# Patient Record
Sex: MALE | Race: WHITE | NOT HISPANIC OR LATINO | Employment: OTHER | URBAN - METROPOLITAN AREA
[De-identification: names, ages, dates, MRNs, and addresses within clinical notes are randomized per-mention and may not be internally consistent; named-entity substitution may affect disease eponyms.]

---

## 2020-05-07 LAB
CHOLEST SERPL-MCNC: 103 MG/DL
HBA1C MFR BLD: 6.2 % (ref 0–5.6)
HDLC SERPL-MCNC: 26 MG/DL
LDLC SERPL CALC-MCNC: 49 MG/DL
MICROAL CRT RATIO 4634: 767
TRIGL SERPL-MCNC: 221 MG/DL

## 2020-09-22 PROBLEM — E29.1 HYPOGONADISM IN MALE: Status: ACTIVE | Noted: 2020-09-22

## 2020-09-22 PROBLEM — I10 ESSENTIAL HYPERTENSION: Status: ACTIVE | Noted: 2020-09-22

## 2020-09-22 PROBLEM — E78.5 DYSLIPIDEMIA: Status: ACTIVE | Noted: 2020-09-22

## 2020-09-22 PROBLEM — E11.65 UNCONTROLLED TYPE 2 DIABETES MELLITUS WITH HYPERGLYCEMIA (HCC): Status: ACTIVE | Noted: 2020-09-22

## 2020-09-22 PROBLEM — E03.9 ACQUIRED HYPOTHYROIDISM: Status: ACTIVE | Noted: 2020-09-22

## 2020-09-22 RX ORDER — METFORMIN HYDROCHLORIDE 500 MG/1
1000 TABLET, EXTENDED RELEASE ORAL
COMMUNITY
End: 2021-07-07

## 2020-09-22 RX ORDER — PREGABALIN 150 MG/1
150 CAPSULE ORAL DAILY
COMMUNITY
End: 2020-09-23 | Stop reason: SDUPTHER

## 2020-09-22 RX ORDER — PIOGLITAZONEHYDROCHLORIDE 30 MG/1
30 TABLET ORAL DAILY
COMMUNITY
End: 2021-03-17

## 2020-09-22 RX ORDER — ATENOLOL 50 MG/1
50 TABLET ORAL DAILY
COMMUNITY
End: 2021-01-04 | Stop reason: SDUPTHER

## 2020-09-22 RX ORDER — TESTOSTERONE CYPIONATE 200 MG/ML
200 INJECTION, SOLUTION INTRAMUSCULAR
COMMUNITY
End: 2020-12-04 | Stop reason: SDUPTHER

## 2020-09-22 RX ORDER — GLIMEPIRIDE 1 MG/1
1 TABLET ORAL EVERY MORNING
COMMUNITY
End: 2020-09-23

## 2020-09-22 RX ORDER — SILDENAFIL 100 MG/1
100 TABLET, FILM COATED ORAL PRN
COMMUNITY

## 2020-09-22 RX ORDER — ATORVASTATIN CALCIUM 40 MG/1
40 TABLET, FILM COATED ORAL NIGHTLY
COMMUNITY
End: 2020-10-01

## 2020-09-22 RX ORDER — LOSARTAN POTASSIUM 50 MG/1
50 TABLET ORAL DAILY
COMMUNITY
End: 2020-12-30

## 2020-09-22 RX ORDER — LEVOTHYROXINE SODIUM 0.07 MG/1
75 TABLET ORAL
COMMUNITY
End: 2020-09-23

## 2020-09-22 NOTE — PROGRESS NOTES
"Endocrinology Clinic Progress Note  PCP: No primary care provider on file.      Reason for consult: Type 2 diabetes, Hypothyroid, Hypogonadism in male.       HPI:  Raheem Ospina is a 55 y.o. old patient who comes in today for evaluation of above stated problems.   1. Type 2 diabetes, uncontrolled.    Most Recent HbA1c:   Lab Results   Component Value Date/Time    HBA1C 6.5 (A) 09/23/2020 02:24 PM      Previous A1c was 6.2 on 5/7/2020  Current Diabetes Regimen:  Ozempic 1 mg per week  Cyclocet 0.8 mg 5 tabs in the morning  Pioglitazone 30 mg daily  Metformin 1000 mg bid    Testing blood sugars, states he has not tested his blood sugar in about 6 weeks.  States when he used to test his blood sugars usually ran in the  range.   Hypoglycemia:  None    Exercise: no regular exercise, sedentary  Diet: \"healthy\" diet  in general  Last Retinal Exam: states current, has a cataract in his right eye.    Daily Foot Exam: yes has neuropathy in both feet.     Foot Exam:  Monofilament: done  Monofilament testing with a 10 gram force: sensation intact: decreased bilaterally  Visual Inspection: Feet with maceration, ulcers, fissures.  He has a ulceration on the bottom of his great toe on the right foot.   Pedal pulses: intact bilaterally    2. Hypogonadism in male: on Testosterone Cypionate 200 mg IM every 21 days.  Last dose was in July.   Testosterone level on 05/07/2020 was 731  Testosterone injection of 200 mg given in clinic today.   3. Hypothyroid: currently on Synthroid 75 mcg every morning on empty stomach.   TSH level on 05/07/2020 was 0.88 and Free T4 level was 1.4  4. Dyslipidemia: currently on Atorvastatin 40 mg per day.      Ref. Range 5/7/2020 00:00   Cholesterol,Tot Unknown 103   Triglycerides Unknown 221   HDL Unknown 26   LDL Unknown 49          ROS:  Constitutional: No weight loss  Cardiac: No palpitations or racing heart  Resp: No shortness of breath  Neuro: No numbness or tinging in feet  Endo: No " "heat or cold intolerance, no polyuria or polydipsia  All other systems were reviewed and were negative.    Past Medical History:  Patient Active Problem List    Diagnosis Date Noted   • Uncontrolled type 2 diabetes mellitus with hyperglycemia (HCC) 09/22/2020   • Dyslipidemia 09/22/2020   • Acquired hypothyroidism 09/22/2020   • Hypogonadism in male 09/22/2020   • Essential hypertension 09/22/2020           Labs: Reviewed    Physical Examination:  Vital signs: BP (!) 88/46 (BP Location: Left arm, Patient Position: Sitting, BP Cuff Size: Adult)   Pulse 95   Ht 1.905 m (6' 3\")   Wt 103 kg (227 lb)   SpO2 96%   BMI 28.37 kg/m²  Body mass index is 28.37 kg/m².  General: No apparent distress, cooperative  Eyes: No scleral icterus or discharge  ENMT: Normal on external inspection of nose, lips, normal thyroid exam  Neck: No abnormal masses on inspection  Resp: Normal effort, clear to auscultation bilaterally   CVS: Regular rate and rhythm, S1 S2 normal, no murmur   Extremities: No edema  Abdomen: abdominal obesity present  Neuro: Alert and oriented  Skin: No rash, visible tatto marks on forearm  Psych: Normal mood and affect, intact memory and able to make informed decisions      Assessment and Plan:  1. Uncontrolled type 2 diabetes mellitus with hyperglycemia (HCC)  Stop cycloset; add farxiga 10 mg daily.   - Discussed diabetic diet, encouraged portion control.   - Discussed importance of testing blood sugars and keeping logs.   - Discussed importance of daily exercise, recommended 30 minutes per day  - Reviewed medications and advised how to take.  - Discussed importance of immunizations and yearly eye exams.   - Advised daily foot  exams. Educated on signs of infection.   - Educated on need to stay well hydrated with water.  - Educated to call with any questions or problems.      2. Dyslipidemia  Continue statin.     3. Acquired hypothyroidism  Continue levothyroxine.     4. Hypogonadism in male  Continue " testosterone and advised to do 200 mg IM every 2 weeks.     5. Essential hypertension  Controlled.     Total face to face time spent with patient equals 60 minutes; 35/60 minutes were spent on counseling the patient about side effect and benefits of ozempic, farxiga, and pathophysiology of testosterone and side effects and benefits.       Return in about 3 months (around 12/23/2020).    Thank you for allowing me to participate in the care of this patient.    Adair Mas M.D.  09/22/20    CC:   No primary care provider on file.    This note was created using voice recognition software (Dragon). The accuracy of the dictation is limited by the abilities of the software. I have reviewed the note prior to signing, however some errors in grammar and context are still possible. If you have any questions related to this note please do not hesitate to contact our office.

## 2020-09-23 ENCOUNTER — OFFICE VISIT (OUTPATIENT)
Dept: ENDOCRINOLOGY | Facility: MEDICAL CENTER | Age: 65
End: 2020-09-23
Attending: INTERNAL MEDICINE
Payer: COMMERCIAL

## 2020-09-23 VITALS
BODY MASS INDEX: 28.23 KG/M2 | OXYGEN SATURATION: 96 % | DIASTOLIC BLOOD PRESSURE: 46 MMHG | SYSTOLIC BLOOD PRESSURE: 88 MMHG | WEIGHT: 227 LBS | HEIGHT: 75 IN | HEART RATE: 95 BPM

## 2020-09-23 DIAGNOSIS — E03.9 ACQUIRED HYPOTHYROIDISM: ICD-10-CM

## 2020-09-23 DIAGNOSIS — G62.9 NEUROPATHY: ICD-10-CM

## 2020-09-23 DIAGNOSIS — E78.5 DYSLIPIDEMIA: ICD-10-CM

## 2020-09-23 DIAGNOSIS — E29.1 HYPOGONADISM IN MALE: ICD-10-CM

## 2020-09-23 DIAGNOSIS — E53.8 VITAMIN B12 DEFICIENCY: ICD-10-CM

## 2020-09-23 DIAGNOSIS — E11.65 UNCONTROLLED TYPE 2 DIABETES MELLITUS WITH HYPERGLYCEMIA (HCC): ICD-10-CM

## 2020-09-23 DIAGNOSIS — E55.9 VITAMIN D DEFICIENCY: ICD-10-CM

## 2020-09-23 DIAGNOSIS — I10 ESSENTIAL HYPERTENSION: ICD-10-CM

## 2020-09-23 LAB
HBA1C MFR BLD: 6.5 % (ref 0–5.6)
INT CON NEG: ABNORMAL
INT CON POS: ABNORMAL

## 2020-09-23 PROCEDURE — 99204 OFFICE O/P NEW MOD 45 MIN: CPT | Performed by: INTERNAL MEDICINE

## 2020-09-23 PROCEDURE — 83036 HEMOGLOBIN GLYCOSYLATED A1C: CPT | Performed by: INTERNAL MEDICINE

## 2020-09-23 PROCEDURE — 99212 OFFICE O/P EST SF 10 MIN: CPT | Performed by: INTERNAL MEDICINE

## 2020-09-23 PROCEDURE — 99205 OFFICE O/P NEW HI 60 MIN: CPT | Performed by: INTERNAL MEDICINE

## 2020-09-23 PROCEDURE — 96372 THER/PROPH/DIAG INJ SC/IM: CPT | Performed by: INTERNAL MEDICINE

## 2020-09-23 RX ORDER — DAPAGLIFLOZIN 10 MG/1
1 TABLET, FILM COATED ORAL DAILY
Qty: 90 TAB | Refills: 3 | Status: SHIPPED | OUTPATIENT
Start: 2020-09-23

## 2020-09-23 RX ORDER — FOLIC ACID/MULTIVIT,IRON,MINER 0.4MG-18MG
TABLET ORAL
COMMUNITY

## 2020-09-23 RX ORDER — MULTIVIT WITH MINERALS/LUTEIN
TABLET ORAL
COMMUNITY
End: 2023-12-31

## 2020-09-23 RX ORDER — ESCITALOPRAM OXALATE 10 MG/1
10 TABLET ORAL DAILY
COMMUNITY
End: 2020-12-28 | Stop reason: SDUPTHER

## 2020-09-23 RX ORDER — PREGABALIN 150 MG/1
150 CAPSULE ORAL 3 TIMES DAILY
Qty: 90 CAP | Refills: 3 | Status: SHIPPED | OUTPATIENT
Start: 2020-09-23 | End: 2021-01-21

## 2020-09-23 RX ORDER — MULTIVIT-MIN/IRON/FOLIC ACID/K 18-600-40
CAPSULE ORAL
COMMUNITY

## 2020-09-23 RX ORDER — SEMAGLUTIDE 1.34 MG/ML
INJECTION, SOLUTION SUBCUTANEOUS
COMMUNITY
End: 2020-10-05

## 2020-09-23 RX ORDER — LEVOTHYROXINE SODIUM 0.07 MG/1
75 TABLET ORAL
COMMUNITY
End: 2020-12-30 | Stop reason: SDUPTHER

## 2020-09-23 RX ORDER — TESTOSTERONE CYPIONATE 200 MG/ML
200 INJECTION, SOLUTION INTRAMUSCULAR
Status: DISCONTINUED | OUTPATIENT
Start: 2020-09-23 | End: 2020-12-28

## 2020-09-23 RX ADMIN — TESTOSTERONE CYPIONATE 200 MG: 200 INJECTION, SOLUTION INTRAMUSCULAR at 14:21

## 2020-10-01 RX ORDER — ATORVASTATIN CALCIUM 40 MG/1
TABLET, FILM COATED ORAL
Qty: 90 TAB | Refills: 2 | Status: SHIPPED | OUTPATIENT
Start: 2020-10-01 | End: 2020-12-30

## 2020-10-05 DIAGNOSIS — E11.65 UNCONTROLLED TYPE 2 DIABETES MELLITUS WITH HYPERGLYCEMIA (HCC): ICD-10-CM

## 2020-10-05 RX ORDER — SEMAGLUTIDE 1.34 MG/ML
INJECTION, SOLUTION SUBCUTANEOUS
Qty: 3 ML | Refills: 1 | Status: SHIPPED | OUTPATIENT
Start: 2020-10-05 | End: 2021-01-04 | Stop reason: SDUPTHER

## 2020-10-09 ENCOUNTER — NON-PROVIDER VISIT (OUTPATIENT)
Dept: ENDOCRINOLOGY | Facility: MEDICAL CENTER | Age: 65
End: 2020-10-09
Attending: INTERNAL MEDICINE
Payer: COMMERCIAL

## 2020-10-09 PROCEDURE — 96372 THER/PROPH/DIAG INJ SC/IM: CPT | Performed by: INTERNAL MEDICINE

## 2020-10-09 RX ADMIN — TESTOSTERONE CYPIONATE 200 MG: 200 INJECTION, SOLUTION INTRAMUSCULAR at 11:23

## 2020-10-09 NOTE — NON-PROVIDER
Raheem Ospina is a 65 y.o. male here for a non-provider visit for testosterone injection.    Reason for injection: low testosterone  Order in MAR?: Yes  Patient supplied?:No  Minimum interval has been met for this injection (per MAR order): Yes    Order and dose verified by: AN  Patient tolerated injection and no adverse effects were observed or reported: Yes    # of Administrations remaining in MAR: 2

## 2020-10-16 ENCOUNTER — TELEPHONE (OUTPATIENT)
Dept: MEDICAL GROUP | Facility: PHYSICIAN GROUP | Age: 65
End: 2020-10-16

## 2020-10-16 NOTE — TELEPHONE ENCOUNTER
1. Caller Name: Anjali Ospina      Call Back Number: [phone]        How would the patient prefer to be contacted with a response:     Patients wife Anjali called because she just scheduled a new patient appointment with you and wanted to know if you would be willing to take her . Anjali was told that you dont take patients over the age of 65. Please advise.

## 2020-10-16 NOTE — TELEPHONE ENCOUNTER
I would love to but at this time I am not taking any new patients, I anticipate opening up in January again.

## 2020-10-23 ENCOUNTER — NON-PROVIDER VISIT (OUTPATIENT)
Dept: ENDOCRINOLOGY | Facility: MEDICAL CENTER | Age: 65
End: 2020-10-23
Attending: INTERNAL MEDICINE
Payer: COMMERCIAL

## 2020-10-23 PROCEDURE — 96372 THER/PROPH/DIAG INJ SC/IM: CPT | Performed by: INTERNAL MEDICINE

## 2020-10-23 RX ADMIN — TESTOSTERONE CYPIONATE 200 MG: 200 INJECTION, SOLUTION INTRAMUSCULAR at 11:00

## 2020-11-06 ENCOUNTER — NON-PROVIDER VISIT (OUTPATIENT)
Dept: ENDOCRINOLOGY | Facility: MEDICAL CENTER | Age: 65
End: 2020-11-06
Attending: INTERNAL MEDICINE
Payer: COMMERCIAL

## 2020-11-06 PROCEDURE — 96372 THER/PROPH/DIAG INJ SC/IM: CPT | Performed by: INTERNAL MEDICINE

## 2020-11-06 PROCEDURE — 700111 HCHG RX REV CODE 636 W/ 250 OVERRIDE (IP): Performed by: INTERNAL MEDICINE

## 2020-11-06 RX ADMIN — TESTOSTERONE CYPIONATE 200 MG: 200 INJECTION, SOLUTION INTRAMUSCULAR at 16:16

## 2020-11-12 ENCOUNTER — TELEPHONE (OUTPATIENT)
Dept: SCHEDULING | Facility: IMAGING CENTER | Age: 65
End: 2020-11-12

## 2020-11-16 ENCOUNTER — HOSPITAL ENCOUNTER (OUTPATIENT)
Facility: MEDICAL CENTER | Age: 65
End: 2020-11-16
Attending: PODIATRIST
Payer: COMMERCIAL

## 2020-11-16 PROCEDURE — 87070 CULTURE OTHR SPECIMN AEROBIC: CPT

## 2020-11-17 ENCOUNTER — HOSPITAL ENCOUNTER (OUTPATIENT)
Dept: RADIOLOGY | Facility: MEDICAL CENTER | Age: 65
End: 2020-11-17
Attending: PODIATRIST
Payer: COMMERCIAL

## 2020-11-17 DIAGNOSIS — L97.519 DIABETIC ULCER OF RIGHT GREAT TOE (HCC): ICD-10-CM

## 2020-11-17 DIAGNOSIS — E11.621 DIABETIC ULCER OF RIGHT GREAT TOE (HCC): ICD-10-CM

## 2020-11-17 PROCEDURE — 73630 X-RAY EXAM OF FOOT: CPT | Mod: RT

## 2020-11-19 LAB
BACTERIA WND AEROBE CULT: ABNORMAL
BACTERIA WND AEROBE CULT: ABNORMAL
SIGNIFICANT IND 70042: ABNORMAL
SITE SITE: ABNORMAL
SOURCE SOURCE: ABNORMAL

## 2020-12-04 ENCOUNTER — NON-PROVIDER VISIT (OUTPATIENT)
Dept: ENDOCRINOLOGY | Facility: MEDICAL CENTER | Age: 65
End: 2020-12-04
Attending: INTERNAL MEDICINE
Payer: COMMERCIAL

## 2020-12-04 DIAGNOSIS — E29.1 HYPOGONADISM IN MALE: ICD-10-CM

## 2020-12-04 PROCEDURE — 700111 HCHG RX REV CODE 636 W/ 250 OVERRIDE (IP): Performed by: INTERNAL MEDICINE

## 2020-12-04 PROCEDURE — 96372 THER/PROPH/DIAG INJ SC/IM: CPT | Performed by: INTERNAL MEDICINE

## 2020-12-04 RX ORDER — TESTOSTERONE CYPIONATE 200 MG/ML
200 INJECTION, SOLUTION INTRAMUSCULAR
Qty: 2 ML | Refills: 5 | Status: SHIPPED | OUTPATIENT
Start: 2020-12-04 | End: 2020-12-28

## 2020-12-04 RX ADMIN — TESTOSTERONE CYPIONATE 200 MG: 200 INJECTION, SOLUTION INTRAMUSCULAR at 10:09

## 2020-12-11 ENCOUNTER — HOSPITAL ENCOUNTER (OUTPATIENT)
Dept: LAB | Facility: MEDICAL CENTER | Age: 65
End: 2020-12-11
Attending: INTERNAL MEDICINE
Payer: COMMERCIAL

## 2020-12-11 DIAGNOSIS — I10 ESSENTIAL HYPERTENSION: ICD-10-CM

## 2020-12-11 DIAGNOSIS — E53.8 VITAMIN B12 DEFICIENCY: ICD-10-CM

## 2020-12-11 DIAGNOSIS — E11.65 UNCONTROLLED TYPE 2 DIABETES MELLITUS WITH HYPERGLYCEMIA (HCC): ICD-10-CM

## 2020-12-11 DIAGNOSIS — G62.9 NEUROPATHY: ICD-10-CM

## 2020-12-11 DIAGNOSIS — E29.1 HYPOGONADISM IN MALE: ICD-10-CM

## 2020-12-11 DIAGNOSIS — E78.5 DYSLIPIDEMIA: ICD-10-CM

## 2020-12-11 DIAGNOSIS — E03.9 ACQUIRED HYPOTHYROIDISM: ICD-10-CM

## 2020-12-11 DIAGNOSIS — E55.9 VITAMIN D DEFICIENCY: ICD-10-CM

## 2020-12-11 LAB
25(OH)D3 SERPL-MCNC: 34 NG/ML (ref 30–100)
ALBUMIN SERPL BCP-MCNC: 4.3 G/DL (ref 3.2–4.9)
ALBUMIN/GLOB SERPL: 1.2 G/DL
ALP SERPL-CCNC: 121 U/L (ref 30–99)
ALT SERPL-CCNC: 11 U/L (ref 2–50)
ANION GAP SERPL CALC-SCNC: 9 MMOL/L (ref 7–16)
AST SERPL-CCNC: 15 U/L (ref 12–45)
BILIRUB SERPL-MCNC: 0.8 MG/DL (ref 0.1–1.5)
BUN SERPL-MCNC: 17 MG/DL (ref 8–22)
CALCIUM SERPL-MCNC: 9.9 MG/DL (ref 8.5–10.5)
CHLORIDE SERPL-SCNC: 98 MMOL/L (ref 96–112)
CHOLEST SERPL-MCNC: 111 MG/DL (ref 100–199)
CO2 SERPL-SCNC: 28 MMOL/L (ref 20–33)
CREAT SERPL-MCNC: 1.08 MG/DL (ref 0.5–1.4)
CREAT UR-MCNC: 104.99 MG/DL
ERYTHROCYTE [DISTWIDTH] IN BLOOD BY AUTOMATED COUNT: 55.3 FL (ref 35.9–50)
FASTING STATUS PATIENT QL REPORTED: NORMAL
GLOBULIN SER CALC-MCNC: 3.7 G/DL (ref 1.9–3.5)
GLUCOSE SERPL-MCNC: 133 MG/DL (ref 65–99)
HCT VFR BLD AUTO: 59.2 % (ref 42–52)
HDLC SERPL-MCNC: 28 MG/DL
HGB BLD-MCNC: 18.2 G/DL (ref 14–18)
LDLC SERPL CALC-MCNC: 52 MG/DL
MCH RBC QN AUTO: 29 PG (ref 27–33)
MCHC RBC AUTO-ENTMCNC: 30.7 G/DL (ref 33.7–35.3)
MCV RBC AUTO: 94.4 FL (ref 81.4–97.8)
MICROALBUMIN UR-MCNC: 55 MG/DL
MICROALBUMIN/CREAT UR: 524 MG/G (ref 0–30)
PLATELET # BLD AUTO: 227 K/UL (ref 164–446)
PMV BLD AUTO: 11.1 FL (ref 9–12.9)
POTASSIUM SERPL-SCNC: 5 MMOL/L (ref 3.6–5.5)
PROT SERPL-MCNC: 8 G/DL (ref 6–8.2)
RBC # BLD AUTO: 6.27 M/UL (ref 4.7–6.1)
SODIUM SERPL-SCNC: 135 MMOL/L (ref 135–145)
T3 SERPL-MCNC: 107 NG/DL (ref 60–181)
T3FREE SERPL-MCNC: 3.12 PG/ML (ref 2–4.4)
T4 FREE SERPL-MCNC: 1.49 NG/DL (ref 0.93–1.7)
TESTOST SERPL-MCNC: 853 NG/DL (ref 175–781)
THYROPEROXIDASE AB SERPL-ACNC: <9 IU/ML (ref 0–9)
TRIGL SERPL-MCNC: 154 MG/DL (ref 0–149)
TSH SERPL DL<=0.005 MIU/L-ACNC: 1.39 UIU/ML (ref 0.38–5.33)
VIT B12 SERPL-MCNC: 346 PG/ML (ref 211–911)
WBC # BLD AUTO: 11.6 K/UL (ref 4.8–10.8)

## 2020-12-11 PROCEDURE — 84439 ASSAY OF FREE THYROXINE: CPT

## 2020-12-11 PROCEDURE — 84480 ASSAY TRIIODOTHYRONINE (T3): CPT

## 2020-12-11 PROCEDURE — 84270 ASSAY OF SEX HORMONE GLOBUL: CPT

## 2020-12-11 PROCEDURE — 82607 VITAMIN B-12: CPT

## 2020-12-11 PROCEDURE — 36415 COLL VENOUS BLD VENIPUNCTURE: CPT

## 2020-12-11 PROCEDURE — 82570 ASSAY OF URINE CREATININE: CPT

## 2020-12-11 PROCEDURE — 82306 VITAMIN D 25 HYDROXY: CPT

## 2020-12-11 PROCEDURE — 80061 LIPID PANEL: CPT

## 2020-12-11 PROCEDURE — 80053 COMPREHEN METABOLIC PANEL: CPT

## 2020-12-11 PROCEDURE — 84403 ASSAY OF TOTAL TESTOSTERONE: CPT

## 2020-12-11 PROCEDURE — 82043 UR ALBUMIN QUANTITATIVE: CPT

## 2020-12-11 PROCEDURE — 84481 FREE ASSAY (FT-3): CPT

## 2020-12-11 PROCEDURE — 85027 COMPLETE CBC AUTOMATED: CPT

## 2020-12-11 PROCEDURE — 84443 ASSAY THYROID STIM HORMONE: CPT

## 2020-12-11 PROCEDURE — 86376 MICROSOMAL ANTIBODY EACH: CPT

## 2020-12-13 LAB — SHBG SERPL-SCNC: 34 NMOL/L (ref 11–80)

## 2020-12-16 PROCEDURE — RXMED WILLOW AMBULATORY MEDICATION CHARGE: Performed by: INTERNAL MEDICINE

## 2020-12-18 ENCOUNTER — APPOINTMENT (OUTPATIENT)
Dept: ENDOCRINOLOGY | Facility: MEDICAL CENTER | Age: 65
End: 2020-12-18
Attending: INTERNAL MEDICINE
Payer: COMMERCIAL

## 2020-12-18 ENCOUNTER — PHARMACY VISIT (OUTPATIENT)
Dept: PHARMACY | Facility: MEDICAL CENTER | Age: 65
End: 2020-12-18
Payer: COMMERCIAL

## 2020-12-28 ENCOUNTER — NON-PROVIDER VISIT (OUTPATIENT)
Dept: ENDOCRINOLOGY | Facility: MEDICAL CENTER | Age: 65
End: 2020-12-28
Attending: INTERNAL MEDICINE
Payer: COMMERCIAL

## 2020-12-28 VITALS — WEIGHT: 224 LBS | BODY MASS INDEX: 27.85 KG/M2 | HEIGHT: 75 IN

## 2020-12-28 DIAGNOSIS — E11.65 UNCONTROLLED TYPE 2 DIABETES MELLITUS WITH HYPERGLYCEMIA (HCC): ICD-10-CM

## 2020-12-28 DIAGNOSIS — F32.A DEPRESSION, UNSPECIFIED DEPRESSION TYPE: ICD-10-CM

## 2020-12-28 DIAGNOSIS — E29.1 HYPOGONADISM IN MALE: ICD-10-CM

## 2020-12-28 DIAGNOSIS — E78.5 DYSLIPIDEMIA: ICD-10-CM

## 2020-12-28 DIAGNOSIS — E03.9 ACQUIRED HYPOTHYROIDISM: ICD-10-CM

## 2020-12-28 PROCEDURE — 99214 OFFICE O/P EST MOD 30 MIN: CPT | Mod: 95,CR | Performed by: INTERNAL MEDICINE

## 2020-12-28 RX ORDER — ESCITALOPRAM OXALATE 10 MG/1
10 TABLET ORAL DAILY
Qty: 30 TAB | Refills: 3 | Status: SHIPPED | OUTPATIENT
Start: 2020-12-28 | End: 2021-03-24

## 2020-12-28 RX ORDER — TESTOSTERONE CYPIONATE 200 MG/ML
150 INJECTION, SOLUTION INTRAMUSCULAR
Qty: 2 ML | Refills: 5 | Status: SHIPPED | OUTPATIENT
Start: 2020-12-28 | End: 2021-05-14

## 2020-12-28 ASSESSMENT — FIBROSIS 4 INDEX: FIB4 SCORE: 1.3

## 2020-12-28 NOTE — PROGRESS NOTES
RN-CDE Note  Patient was presented for a telehealth consultation via secure and encrypted videoconferencing technology. This encounter was conducted via Zoom . Verbal consent was obtained. Patient's identity was verified.    Subjective:   Endocrinology Clinic Progress Note  PCP: Pcp Pt States None    HPI:  Raheem Ospina is a 65 y.o. old patient who is seen today for review of type 2 Diabetes, Hypothyroidism, and Hypogonadism.    Patient has a history of fairly controlled type 2 diabetes and is on Metformin 1000 mg twice a day, Actos 30 mg daily, Ozempic 1 mg once a week and Farxiga 10 mg daily.  His last A1c was 6.5% in September 23, 2020 we do not have an updated A1c      He also has primary hypothyroidism and is taking levothyroxine 75 mcg daily which has been his medication for many years and his last TSH was normal 1.39 with a free T4 of 1.49 on December 2020      He has hyperlipidemia and is taking atorvastatin and his last LDL cholesterol was at goal at 52 on December 2020 he does have diabetic kidney disease but he is on an ACE inhibitor.    He has hypogonadism and is on testosterone injections 200 mg every 2 weeks at the [facility] pharmacy his last total testosterone was good at 853 but hematocrit was elevated at 59%.  He denies any symptoms of testosterone excess    He has a history of chronic back pain and was previously on opioids chronically which led to the diagnosis of hypogonadism.      DM:   Last A1c:   Lab Results   Component Value Date/Time    HBA1C 6.5 (A) 09/23/2020 02:24 PM      A1C GOAL: < 7    Diabetes Medications:   Ozempic 1 mg weekly  Actos 30 mg daily  Metformin 1000 mg BID  Farxiga 10 mg daily    Taking above medications as prescribed: yes  Taking daily ASA: Yes    Exercise: no regular exercise, sedentary.  Limited due to right big toe ulceration.  Diet: Eating 1 meal daily and only when hungry.  He has lost 35 pounds since leaving [place].  Patient's body mass index is  unknown because there is no height or weight on file. Exercise and nutrition counseling were performed at this visit.    Glucose monitoring frequency: Testing 1-2 times daily  110-130  Hypoglycemic episodes: no  Last Retinal Exam: In [place] in July and has a cataract  Daily Foot Exam: Yes, seeing Dr. Cordova and his right toe ulceration is almost healed.    Lab Results   Component Value Date/Time    MICROALBCALC 767 05/07/2020    MALBCRT 524 (H) 12/11/2020 06:59 AM    MICROALBUR 55.0 12/11/2020 06:59 AM      ACR Albumin/Creatinine Ratio goal <30   Currently Rx ACE/ARB:yes  Dyslipidemia:  Lab Results   Component Value Date/Time    CHOLSTRLTOT 111 12/11/2020 06:59 AM    LDL 52 12/11/2020 06:59 AM    HDL 28 (A) 12/11/2020 06:59 AM    TRIGLYCERIDE 154 (H) 12/11/2020 06:59 AM       Currently Rx Statin: Yes     He  reports that he has been smoking cigarettes. He has a 35.25 pack-year smoking history. He has never used smokeless tobacco.      ROS:     CONS:     No fever, no chills   EYES:     No diplopia, no blurry vision   CV:           No chest pain, no palpitations   PULM:     No SOB, no cough, no hemoptysis.   GI:            No nausea, no vomiting, no diarrhea, no constipation   ENDO:     No polyuria, no polydipsia, no heat intolerance, no cold intolerance       Past Medical History:  Problem List:  2020-09: Uncontrolled type 2 diabetes mellitus with hyperglycemia   (HCC)  2020-09: Dyslipidemia  2020-09: Acquired hypothyroidism  2020-09: Hypogonadism in male  2020-09: Essential hypertension      Past Surgical History:  Past Surgical History:   Procedure Laterality Date   • MULTIPLE CORONARY ARTERY BYPASS          Allergies:  Benicar [olmesartan]     Social History:  Social History     Tobacco Use   • Smoking status: Current Every Day Smoker     Packs/day: 0.75     Years: 47.00     Pack years: 35.25     Types: Cigarettes   • Smokeless tobacco: Never Used   Substance Use Topics   • Alcohol use: Not on file   • Drug use:  "Not on file        Family History:   family history is not on file.      PHYSICAL EXAM:   OBJECTIVE:  Vital signs: Ht 1.905 m (6' 3\")   Wt 101.6 kg (224 lb)   BMI 28.00 kg/m²   GENERAL: Well-developed, well-nourished in no apparent distress.   EYE:  No ocular asymmetry, PERRLA  HENT: Pink, moist mucous membranes.    NECK: No thyromegaly.   CARDIOVASCULAR:  No murmurs  LUNGS: Clear breath sounds  ABDOMEN: Soft, nontender   EXTREMITIES: No clubbing, cyanosis, or edema.   NEUROLOGICAL: No gross focal motor abnormalities   LYMPH: No cervical adenopathy seen  SKIN: No rashes, lesions.     Labs:  Lab Results   Component Value Date/Time    HBA1C 6.5 (A) 09/23/2020 02:24 PM        Lab Results   Component Value Date/Time    WBC 11.6 (H) 12/11/2020 06:59 AM    RBC 6.27 (H) 12/11/2020 06:59 AM    HEMOGLOBIN 18.2 (H) 12/11/2020 06:59 AM    MCV 94.4 12/11/2020 06:59 AM    MCH 29.0 12/11/2020 06:59 AM    MCHC 30.7 (L) 12/11/2020 06:59 AM    RDW 55.3 (H) 12/11/2020 06:59 AM    MPV 11.1 12/11/2020 06:59 AM       Lab Results   Component Value Date/Time    SODIUM 135 12/11/2020 06:59 AM    POTASSIUM 5.0 12/11/2020 06:59 AM    CHLORIDE 98 12/11/2020 06:59 AM    CO2 28 12/11/2020 06:59 AM    ANION 9.0 12/11/2020 06:59 AM    GLUCOSE 133 (H) 12/11/2020 06:59 AM    BUN 17 12/11/2020 06:59 AM    CREATININE 1.08 12/11/2020 06:59 AM    CALCIUM 9.9 12/11/2020 06:59 AM    ASTSGOT 15 12/11/2020 06:59 AM    ALTSGPT 11 12/11/2020 06:59 AM    TBILIRUBIN 0.8 12/11/2020 06:59 AM    ALBUMIN 4.3 12/11/2020 06:59 AM    TOTPROTEIN 8.0 12/11/2020 06:59 AM    GLOBULIN 3.7 (H) 12/11/2020 06:59 AM    AGRATIO 1.2 12/11/2020 06:59 AM       Lab Results   Component Value Date/Time    CHOLSTRLTOT 111 12/11/2020 0659    TRIGLYCERIDE 154 (H) 12/11/2020 0659    HDL 28 (A) 12/11/2020 0659    LDL 52 12/11/2020 0659       Lab Results   Component Value Date/Time    MICROALBCALC 767 05/07/2020    MALBCRT 524 (H) 12/11/2020 06:59 AM    MICROALBUR 55.0 12/11/2020 06:59 " AM        Lab Results   Component Value Date/Time    TSHULTRASEN 1.390 12/11/2020 0659     No results found for: FREEDIR  Lab Results   Component Value Date/Time    FREET3 3.12 12/11/2020 0659     No results found for: THYSTIMIG        ASSESSMENT/PLAN:     1. Uncontrolled type 2 diabetes mellitus with hyperglycemia (HCC)    Now well controlled continue current diabetes medications  Recommend adequate hydration  Recommend follow-up in 3 6 months a repeat of A1c    Discussed and educated on:   - All medications, side effects and compliance (discussed carefully)  - Annual eye examinations at Ophthalmology  - Home glucose monitoring emphasized  - Weight control and daily exercise    2. Acquired hypothyroidism  Well-controlled  Continue current levothyroxine dose  Recommend repeating TSH in 6 months    3. Dyslipidemia  Controlled continue atorvastatin  Recommend repeating fasting repeat in 12 months    4. Hypogonadism in male  Unstable recommend adjusting testosterone to 150 mg every 2 weeks we will schedule for phlebotomy also recommend evaluation for sleep apnea with primary care physician    5. Depression, unspecified depression type  Stable we are refilling his Lexapro 1 time but in the future he should get future refills from his primary care physician      No follow-ups on file.      This patient during there office visit today was started on a new medication.  Side effects of the new medication were discussed with the patient today in the office.     Thank you kindly for allowing me to participate in the diabetes care plan for this patient.    Bartolome Willoughby MD, [place], [facility]  12/28/20    CC:   Pcp Pt States None

## 2020-12-30 DIAGNOSIS — E03.9 ACQUIRED HYPOTHYROIDISM: ICD-10-CM

## 2020-12-30 RX ORDER — LEVOTHYROXINE SODIUM 0.07 MG/1
TABLET ORAL
Qty: 90 TAB | Refills: 2 | Status: SHIPPED | OUTPATIENT
Start: 2020-12-30 | End: 2021-01-29

## 2020-12-30 RX ORDER — LOSARTAN POTASSIUM 50 MG/1
TABLET ORAL
Qty: 90 TAB | Refills: 2 | Status: SHIPPED | OUTPATIENT
Start: 2020-12-30 | End: 2021-07-07

## 2020-12-30 RX ORDER — ATORVASTATIN CALCIUM 40 MG/1
TABLET, FILM COATED ORAL
Qty: 90 TAB | Refills: 2 | Status: SHIPPED | OUTPATIENT
Start: 2020-12-30

## 2020-12-30 RX ORDER — LEVOTHYROXINE SODIUM 0.07 MG/1
75 TABLET ORAL
Qty: 90 TAB | Refills: 2 | Status: SHIPPED | OUTPATIENT
Start: 2020-12-30

## 2020-12-30 NOTE — TELEPHONE ENCOUNTER
Received request via: Patient    Was the patient seen in the last year in this department? Yes    Does the patient have an active prescription (recently filled or refills available) for medication(s) requested? No     LEVOTHYROXINE SODIUM 75MCG TAB        Will file in chart as: levothyroxine (SYNTHROID) 75 MCG Tab   Sig: TAKE ONE TABLET BY MOUTH DAILY   Disp:  30 Tab    Refills:  Not specified   Start: 12/29/2020   Class: Normal   Non-formulary   Last ordered: 3 months ago by Physician Outpatient Last refill: 11/24/2020   Rx #: [id number]      ATORVASTATIN CALCIUM 40MG TAB        Will file in chart as: atorvastatin (LIPITOR) 40 MG Tab   Sig: TAKE ONE TABLET BY MOUTH DAILY   Disp:  90 Tab    Refills:  1   Start: 12/29/2020   Class: Normal   Non-formulary   Last ordered: 2 months ago by Adair Mas M.D. Last refill: 10/5/2020   Rx #: [id number]      LOSARTAN POTASSIUM 50MG TAB        Will file in chart as: losartan (COZAAR) 50 MG Tab   Sig: TAKE ONE TABLET BY MOUTH DAILY   Disp:  90 Tab    Refills:  Not specified   Start: 12/29/2020   Class: Normal   Non-formulary   Last ordered: 3 months ago by Physician Outpatient Last refill: 9/18/2020   Rx #: [id number]

## 2021-01-04 DIAGNOSIS — I10 ESSENTIAL HYPERTENSION: ICD-10-CM

## 2021-01-04 DIAGNOSIS — E11.65 UNCONTROLLED TYPE 2 DIABETES MELLITUS WITH HYPERGLYCEMIA (HCC): ICD-10-CM

## 2021-01-04 RX ORDER — SEMAGLUTIDE 1.34 MG/ML
1 INJECTION, SOLUTION SUBCUTANEOUS
Qty: 2 EACH | Refills: 3 | Status: SHIPPED | OUTPATIENT
Start: 2021-01-04 | End: 2021-03-30

## 2021-01-04 RX ORDER — ATENOLOL 50 MG/1
50 TABLET ORAL DAILY
Qty: 30 TAB | Refills: 3 | Status: SHIPPED | OUTPATIENT
Start: 2021-01-04 | End: 2021-02-09

## 2021-01-08 ENCOUNTER — PHARMACY VISIT (OUTPATIENT)
Dept: PHARMACY | Facility: MEDICAL CENTER | Age: 66
End: 2021-01-08
Payer: COMMERCIAL

## 2021-01-08 PROCEDURE — RXMED WILLOW AMBULATORY MEDICATION CHARGE: Performed by: INTERNAL MEDICINE

## 2021-01-13 ENCOUNTER — TELEPHONE (OUTPATIENT)
Dept: ENDOCRINOLOGY | Facility: MEDICAL CENTER | Age: 66
End: 2021-01-13

## 2021-01-13 NOTE — TELEPHONE ENCOUNTER
FINAL PRIOR AUTHORIZATION STATUS:    1.  Name of Medication & Dose: Testosterone 200mg/1ml      2. Prior Auth Status: Approved through [facility] [employer]     3. Action Taken: Pharmacy Notified: yes Patient Notified: yes      Ref# [id number].    Approval date from 01/13/21-01/13/22.    I asked for a retro prior auth beginning on 12/18/20 when the member first paid out of pocket for the testosterone. PA member placed me on a brief hold and contacted that department to see what they could do. The representative let me know that retro has been approved and the pharmacy will take care of the reimbursement. I let patient know via [employer].

## 2021-01-28 SDOH — ECONOMIC STABILITY: HOUSING INSECURITY
IN THE LAST 12 MONTHS, WAS THERE A TIME WHEN YOU DID NOT HAVE A STEADY PLACE TO SLEEP OR SLEPT IN A SHELTER (INCLUDING NOW)?: NO

## 2021-01-28 SDOH — ECONOMIC STABILITY: INCOME INSECURITY: IN THE LAST 12 MONTHS, WAS THERE A TIME WHEN YOU WERE NOT ABLE TO PAY THE MORTGAGE OR RENT ON TIME?: NO

## 2021-01-28 SDOH — HEALTH STABILITY: PHYSICAL HEALTH: ON AVERAGE, HOW MANY MINUTES DO YOU ENGAGE IN EXERCISE AT THIS LEVEL?: 0 MINUTES

## 2021-01-28 SDOH — ECONOMIC STABILITY: HOUSING INSECURITY

## 2021-01-28 SDOH — HEALTH STABILITY: MENTAL HEALTH
STRESS IS WHEN SOMEONE FEELS TENSE, NERVOUS, ANXIOUS, OR CAN'T SLEEP AT NIGHT BECAUSE THEIR MIND IS TROUBLED. HOW STRESSED ARE YOU?: NOT AT ALL

## 2021-01-28 SDOH — ECONOMIC STABILITY: TRANSPORTATION INSECURITY
IN THE PAST 12 MONTHS, HAS LACK OF RELIABLE TRANSPORTATION KEPT YOU FROM MEDICAL APPOINTMENTS, MEETINGS, WORK OR FROM GETTING THINGS NEEDED FOR DAILY LIVING?: NO

## 2021-01-28 SDOH — ECONOMIC STABILITY: TRANSPORTATION INSECURITY
IN THE PAST 12 MONTHS, HAS THE LACK OF TRANSPORTATION KEPT YOU FROM MEDICAL APPOINTMENTS OR FROM GETTING MEDICATIONS?: NO

## 2021-01-28 SDOH — HEALTH STABILITY: PHYSICAL HEALTH: ON AVERAGE, HOW MANY DAYS PER WEEK DO YOU ENGAGE IN MODERATE TO STRENUOUS EXERCISE (LIKE A BRISK WALK)?: 0 DAYS

## 2021-01-28 ASSESSMENT — SOCIAL DETERMINANTS OF HEALTH (SDOH)
HOW OFTEN DO YOU ATTENT MEETINGS OF THE CLUB OR ORGANIZATION YOU BELONG TO?: NEVER
WITHIN THE PAST 12 MONTHS, THE FOOD YOU BOUGHT JUST DIDN'T LAST AND YOU DIDN'T HAVE MONEY TO GET MORE: NEVER TRUE
WITHIN THE PAST 12 MONTHS, YOU WORRIED THAT YOUR FOOD WOULD RUN OUT BEFORE YOU GOT THE MONEY TO BUY MORE: NEVER TRUE
DO YOU BELONG TO ANY CLUBS OR ORGANIZATIONS SUCH AS CHURCH GROUPS UNIONS, FRATERNAL OR ATHLETIC GROUPS, OR SCHOOL GROUPS?: YES
HOW OFTEN DO YOU HAVE A DRINK CONTAINING ALCOHOL: NEVER
IN A TYPICAL WEEK, HOW MANY TIMES DO YOU TALK ON THE PHONE WITH FAMILY, FRIENDS, OR NEIGHBORS?: ONCE A WEEK
HOW OFTEN DO YOU HAVE SIX OR MORE DRINKS ON ONE OCCASION: NEVER
HOW OFTEN DO YOU GET TOGETHER WITH FRIENDS OR RELATIVES?: NEVER
HOW HARD IS IT FOR YOU TO PAY FOR THE VERY BASICS LIKE FOOD, HOUSING, MEDICAL CARE, AND HEATING?: NOT HARD AT ALL
HOW OFTEN DO YOU ATTEND CHURCH OR RELIGIOUS SERVICES?: NEVER

## 2021-01-29 ENCOUNTER — OFFICE VISIT (OUTPATIENT)
Dept: MEDICAL GROUP | Facility: PHYSICIAN GROUP | Age: 66
End: 2021-01-29
Payer: COMMERCIAL

## 2021-01-29 VITALS
BODY MASS INDEX: 29.26 KG/M2 | SYSTOLIC BLOOD PRESSURE: 122 MMHG | DIASTOLIC BLOOD PRESSURE: 82 MMHG | HEART RATE: 78 BPM | TEMPERATURE: 97.7 F | OXYGEN SATURATION: 95 % | HEIGHT: 74 IN | WEIGHT: 228 LBS | RESPIRATION RATE: 14 BRPM

## 2021-01-29 DIAGNOSIS — I10 ESSENTIAL HYPERTENSION: ICD-10-CM

## 2021-01-29 DIAGNOSIS — E03.9 ACQUIRED HYPOTHYROIDISM: ICD-10-CM

## 2021-01-29 DIAGNOSIS — E11.65 UNCONTROLLED TYPE 2 DIABETES MELLITUS WITH HYPERGLYCEMIA (HCC): ICD-10-CM

## 2021-01-29 DIAGNOSIS — E78.5 DYSLIPIDEMIA: ICD-10-CM

## 2021-01-29 DIAGNOSIS — Z11.59 NEED FOR HEPATITIS C SCREENING TEST: ICD-10-CM

## 2021-01-29 DIAGNOSIS — D75.1 ERYTHROCYTOSIS: ICD-10-CM

## 2021-01-29 DIAGNOSIS — E29.1 HYPOGONADISM IN MALE: ICD-10-CM

## 2021-01-29 DIAGNOSIS — Z23 NEED FOR VACCINATION: ICD-10-CM

## 2021-01-29 PROBLEM — E11.42 DIABETIC PERIPHERAL NEUROPATHY (HCC): Status: ACTIVE | Noted: 2021-01-29

## 2021-01-29 PROBLEM — G62.9 PERIPHERAL NEUROPATHY: Status: ACTIVE | Noted: 2021-01-29

## 2021-01-29 PROCEDURE — 99204 OFFICE O/P NEW MOD 45 MIN: CPT | Mod: 25 | Performed by: INTERNAL MEDICINE

## 2021-01-29 PROCEDURE — 90471 IMMUNIZATION ADMIN: CPT | Performed by: INTERNAL MEDICINE

## 2021-01-29 PROCEDURE — 90750 HZV VACC RECOMBINANT IM: CPT | Performed by: INTERNAL MEDICINE

## 2021-01-29 RX ORDER — PREGABALIN 75 MG/1
75 CAPSULE ORAL 3 TIMES DAILY
COMMUNITY
End: 2021-02-08 | Stop reason: SDUPTHER

## 2021-01-29 ASSESSMENT — PATIENT HEALTH QUESTIONNAIRE - PHQ9: CLINICAL INTERPRETATION OF PHQ2 SCORE: 0

## 2021-01-29 ASSESSMENT — FIBROSIS 4 INDEX: FIB4 SCORE: 1.3

## 2021-01-29 NOTE — ASSESSMENT & PLAN NOTE
This is a chronic problem.  The patient is on atenolol 50 mg daily, losartan 50 mg daily.  His blood pressure is normal at today's visit, 122/82, with a pulse of 78.  He denies any headache, chest pain, shortness of breath, or lower extremity edema.

## 2021-01-29 NOTE — ASSESSMENT & PLAN NOTE
This is a chronic problem.  Labs from 12/11/2020 showed a total cholesterol of 111, LDL 52, HDL 28, triglycerides 154.  The patient is on atorvastatin 40 mg daily.  He denies myalgias associated with the medication.

## 2021-01-29 NOTE — ASSESSMENT & PLAN NOTE
This is a chronic problem.  The patient is on testosterone 150 mg every 2 weeks.  Labs from 12/11/2020 showed a testosterone level of 853.  His testosterone dose has already been reduced by Dr. Mas.

## 2021-01-29 NOTE — ASSESSMENT & PLAN NOTE
This is a chronic problem.  The patient's  TSH on 12/11/2020 was 1.39.  He is on levothyroxine 75 mcg daily.  His symptoms are well controlled on this dose.  Patient is followed by Dr. Mas in Endocrinology.

## 2021-01-29 NOTE — PROGRESS NOTES
Subjective:     CC: Establish care    HISTORY OF THE PRESENT ILLNESS: Patient is a 65 y.o. male. This pleasant patient is here today to establish care and discuss following issues:    Dyslipidemia  This is a chronic problem.  Labs from 12/11/2020 showed a total cholesterol of 111, LDL 52, HDL 28, triglycerides 154.  The patient is on atorvastatin 40 mg daily.  He denies myalgias associated with the medication.      Acquired hypothyroidism  This is a chronic problem.  The patient's  TSH on 12/11/2020 was 1.39.  He is on levothyroxine 75 mcg daily.  His symptoms are well controlled on this dose.  Patient is followed by Dr. Mas in Endocrinology.    Essential hypertension  This is a chronic problem.  The patient is on atenolol 50 mg daily, losartan 50 mg daily.  His blood pressure is normal at today's visit, 122/82, with a pulse of 78.  He denies any headache, chest pain, shortness of breath, or lower extremity edema.    Diabetic peripheral neuropathy (HCC)  This is a chronic problem.  The patient reports bilateral foot numbness and tingling.  He is on pregabalin 75 mg 2-3 times daily.  He denies any neuropathic pain.    Hypogonadism in male  This is a chronic problem.  The patient is on testosterone 150 mg every 2 weeks.  Labs from 12/11/2020 showed a testosterone level of 853.  His testosterone dose has already been reduced by Dr. Mas.     Erythrocytosis  This is an acute problem.  The patient's most recent hemoglobin and hematocrit from 12/11/2020 were elevated at 18.2/59.2.  He has been scheduled for a phlebotomy session at the [facility] center.    Uncontrolled type 2 diabetes mellitus with hyperglycemia (HCC)  This is a chronic problem.  Patient is followed by Dr. Mas in endocrinology.  He is on Farxiga 10 mg daily, Metformin 1000 mg twice daily, pioglitazone 30 mg daily, Ozempic 1 mg every 7 days.      Allergies: Benicar [olmesartan]    Current Outpatient Medications Ordered in Epic   Medication Sig  Dispense Refill   • pregabalin (LYRICA) 75 MG Cap Take 75 mg by mouth 3 times a day.     • Semaglutide, 1 MG/DOSE, (OZEMPIC, 1 MG/DOSE,) 2 MG/1.5ML Solution Pen-injector Inject 1 mg under the skin every 7 days. 2 Each 3   • atenolol (TENORMIN) 50 MG Tab Take 1 Tab by mouth every day. 30 Tab 3   • atorvastatin (LIPITOR) 40 MG Tab TAKE ONE TABLET BY MOUTH DAILY 90 Tab 2   • losartan (COZAAR) 50 MG Tab TAKE ONE TABLET BY MOUTH DAILY 90 Tab 2   • levothyroxine (SYNTHROID) 75 MCG Tab Take 1 Tab by mouth Every morning on an empty stomach. 90 Tab 2   • escitalopram (LEXAPRO) 10 MG Tab Take 1 Tab by mouth every day. 30 Tab 3   • testosterone cypionate (DEPO-TESTOSTERONE) 200 MG/ML Solution injection Inject 0.75 mL into the shoulder, thigh, or buttocks every 14 days for 30 days. 2 mL 5   • Cholecalciferol (VITAMIN D) 50 MCG (2000 UT) Cap Take  by mouth.     • Ascorbic Acid (VITAMIN C) 1000 MG Tab Take  by mouth.     • Omega-3 Fatty Acids (OMEGA-3 FISH OIL) 1200 MG Cap Take  by mouth.     • Dapagliflozin Propanediol (FARXIGA) 10 MG Tab Take 1 tablet by mouth every day. 90 Tab 3   • aspirin EC (ECOTRIN) 325 MG Tablet Delayed Response Take 325 mg by mouth every day.     • metFORMIN ER (GLUCOPHAGE XR) 500 MG TABLET SR 24 HR Take 1,000 mg by mouth 2 times daily, before breakfast and dinner.     • pioglitazone (ACTOS) 30 MG Tab Take 30 mg by mouth every day.     • sildenafil citrate (VIAGRA) 100 MG tablet Take 100 mg by mouth as needed.     • Coenzyme Q10 (CO Q 10 PO) Take  by mouth.       No current Epic-ordered facility-administered medications on file.        Past Medical History:   Diagnosis Date   • Cardiovascular disease    • Coronary artery disease    • Diabetes (HCC)    • Diabetic neuropathy (HCC)    • Hyperlipidemia    • Hypertension    • Thyroid disease        Past Surgical History:   Procedure Laterality Date   • MULTIPLE CORONARY ARTERY BYPASS         Social History     Tobacco Use   • Smoking status: Current Every Day  "Smoker     Packs/day: 0.75     Years: 47.00     Pack years: 35.25     Types: Cigarettes   • Smokeless tobacco: Never Used   Substance Use Topics   • Alcohol Use     Frequency: Never     Binge frequency: Never   • Drug use: Not on file       Social History     Social History Narrative   • Not on file       Health Maintenance: Completed    ROS:   Gen: no fevers/chills, no changes in weight  Pulm: no sob, no cough  CV: no chest pain, no palpitations  GI: no nausea/vomiting, no diarrhea  MSk: no myalgias  Skin: no rash  Neuro: no headaches, no numbness/tingling  Heme/Lymph: no easy bruising      Objective:     Exam: /82   Pulse 78   Temp 36.5 °C (97.7 °F)   Resp 14   Ht 1.88 m (6' 2\")   Wt 103 kg (228 lb)   SpO2 95%  Body mass index is 29.27 kg/m².    General: Normal appearing. No distress.  HEENT: Pupils equal and reactive to light accommodation, oropharynx is without erythema, edema or exudates.   Pulmonary: Clear to ausculation.  Normal effort. No rales, ronchi, or wheezing.  Cardiovascular: Regular rate and rhythm without murmur. Carotid and radial pulses are intact and equal bilaterally.  Abdomen: Soft, nontender, nondistended.  Skin: Numerous solar lentigines on bilateral shins, benign-appearing  Musculoskeletal: No extremity cyanosis, clubbing, or edema.        Assessment & Plan:   65 y.o. male with the following -    1. Need for vaccination  - Shingles Vaccine (SHINGRIX)    2. Need for hepatitis C screening test  - HCV Scrn ( [id number] 1xLife); Future    3. Dyslipidemia  This is a chronic problem.  Recent lipid profile was at goal.  -Continue atorvastatin 40 mg nightly    4. Acquired hypothyroidism  This is a chronic problem.  Patient's recent TSH was 1.39.  -Continue levothyroxine 75 mcg daily    5. Essential hypertension  Is a chronic problem.  Patient's blood pressure is normal today.  -Continue atenolol 50 mg daily and losartan 50 mg daily    6. Hypogonadism in male  This is a chronic problem. "  The patient is currently on testosterone 150 mg every 2 weeks, down from 200 mg due to an elevated testosterone level of 853 on recent labs.    7. Erythrocytosis  This is an acute problem.  Patient with an erythrocytosis due to his testosterone therapy.  He has been scheduled for a phlebotomy session at the infusion center.    8. Uncontrolled type 2 diabetes mellitus with hyperglycemia (HCC)  This is a chronic problem.  The patient is followed by .  His most recent A1c was 6.5 on 9/23/2020  -Continue Farxiga 10 mg daily  -Continue Metformin 1000 mg twice daily  -Continue pioglitazone 30 mg daily  -Continue Ozempic 1 mg every 7 days      I spent a total of 45 minutes with record review, exam, communication with the patient, communication with other providers, and documentation of this encounter.    Return in about 6 months (around 7/29/2021) for f/u labs.    Please note that this dictation was created using voice recognition software. I have made every reasonable attempt to correct obvious errors, but I expect that there are errors of grammar and possibly content that I did not discover before finalizing the note.

## 2021-01-29 NOTE — ASSESSMENT & PLAN NOTE
This is a chronic problem.  The patient reports bilateral foot numbness and tingling.  He is on pregabalin 75 mg 2-3 times daily.  He denies any neuropathic pain.

## 2021-01-29 NOTE — ASSESSMENT & PLAN NOTE
This is a chronic problem.  Patient is followed by Dr. Mas in endocrinology.  He is on Farxiga 10 mg daily, Metformin 1000 mg twice daily, pioglitazone 30 mg daily, Ozempic 1 mg every 7 days.

## 2021-01-29 NOTE — ASSESSMENT & PLAN NOTE
This is an acute problem.  The patient's most recent hemoglobin and hematocrit from 12/11/2020 were elevated at 18.2/59.2.  He has been scheduled for a phlebotomy session at the infusion center.

## 2021-02-04 ENCOUNTER — OUTPATIENT INFUSION SERVICES (OUTPATIENT)
Dept: ONCOLOGY | Facility: MEDICAL CENTER | Age: 66
End: 2021-02-04
Attending: INTERNAL MEDICINE
Payer: COMMERCIAL

## 2021-02-04 VITALS
OXYGEN SATURATION: 98 % | TEMPERATURE: 98.4 F | RESPIRATION RATE: 18 BRPM | HEART RATE: 87 BPM | WEIGHT: 228.18 LBS | SYSTOLIC BLOOD PRESSURE: 114 MMHG | HEIGHT: 72 IN | DIASTOLIC BLOOD PRESSURE: 60 MMHG | BODY MASS INDEX: 30.91 KG/M2

## 2021-02-04 DIAGNOSIS — D75.1 POLYCYTHEMIA: ICD-10-CM

## 2021-02-04 LAB
HCT VFR BLD CALC: 63 % (ref 42–52)
HGB BLD-MCNC: 21.4 G/DL (ref 14–18)

## 2021-02-04 PROCEDURE — 36415 COLL VENOUS BLD VENIPUNCTURE: CPT

## 2021-02-04 PROCEDURE — 99195 PHLEBOTOMY: CPT

## 2021-02-04 PROCEDURE — 85014 HEMATOCRIT: CPT

## 2021-02-04 ASSESSMENT — FIBROSIS 4 INDEX: FIB4 SCORE: 1.3

## 2021-02-04 ASSESSMENT — PAIN DESCRIPTION - PAIN TYPE: TYPE: CHRONIC PAIN

## 2021-02-04 NOTE — PROGRESS NOTES
Pt presented to infusion center for first therapeutic phlebotomy for plycythemia. Education given on disease process and procedure. Pt agreeable. Pt provided snack as he had not eaten. BP elevated due to anxiety per pt, nervous about new things. PIV started, brisk blood return observed. ISTAT H/H run, pt meets parameters for treatment. 500 cc whole blood phlebotomized per order, pt tolerated well. Orthostatic blood pressures taken and pt WNL. No dizziness or lightheadedness.  PIV flushed and removed, gauze and coban dressing placed. Pt to communicate with MD about future plan, no further appts at this time, left on foot to self care in NAD.

## 2021-02-05 ENCOUNTER — PHARMACY VISIT (OUTPATIENT)
Dept: PHARMACY | Facility: MEDICAL CENTER | Age: 66
End: 2021-02-05
Payer: COMMERCIAL

## 2021-02-05 PROCEDURE — RXMED WILLOW AMBULATORY MEDICATION CHARGE: Performed by: INTERNAL MEDICINE

## 2021-02-08 DIAGNOSIS — E11.65 UNCONTROLLED TYPE 2 DIABETES MELLITUS WITH HYPERGLYCEMIA (HCC): ICD-10-CM

## 2021-02-08 RX ORDER — PREGABALIN 75 MG/1
75 CAPSULE ORAL 3 TIMES DAILY
Qty: 90 CAP | Refills: 3 | Status: SHIPPED | OUTPATIENT
Start: 2021-02-08 | End: 2021-03-10

## 2021-02-12 ENCOUNTER — APPOINTMENT (OUTPATIENT)
Dept: MEDICAL GROUP | Facility: PHYSICIAN GROUP | Age: 66
End: 2021-02-12
Payer: COMMERCIAL

## 2021-03-03 DIAGNOSIS — Z23 NEED FOR VACCINATION: ICD-10-CM

## 2021-03-12 ENCOUNTER — PHARMACY VISIT (OUTPATIENT)
Dept: PHARMACY | Facility: MEDICAL CENTER | Age: 66
End: 2021-03-12
Payer: COMMERCIAL

## 2021-03-12 PROCEDURE — RXMED WILLOW AMBULATORY MEDICATION CHARGE: Performed by: INTERNAL MEDICINE

## 2021-03-17 DIAGNOSIS — E11.65 UNCONTROLLED TYPE 2 DIABETES MELLITUS WITH HYPERGLYCEMIA (HCC): ICD-10-CM

## 2021-03-17 RX ORDER — PIOGLITAZONEHYDROCHLORIDE 30 MG/1
TABLET ORAL
Qty: 90 TABLET | Refills: 1 | Status: SHIPPED | OUTPATIENT
Start: 2021-03-17 | End: 2021-07-07

## 2021-03-18 DIAGNOSIS — F32.A DEPRESSION, UNSPECIFIED DEPRESSION TYPE: ICD-10-CM

## 2021-03-19 NOTE — TELEPHONE ENCOUNTER
Received request via: Pharmacy    Was the patient seen in the last year in this department? Yes    Does the patient have an active prescription (recently filled or refills available) for medication(s) requested? No       escitalopram (LEXAPRO) 10 MG Tab       Sig: Take 1 Tab by mouth every day

## 2021-03-24 RX ORDER — ESCITALOPRAM OXALATE 10 MG/1
10 TABLET ORAL DAILY
Qty: 90 TABLET | Refills: 0 | Status: SHIPPED | OUTPATIENT
Start: 2021-03-24 | End: 2021-06-18 | Stop reason: SDUPTHER

## 2021-03-30 DIAGNOSIS — E11.65 UNCONTROLLED TYPE 2 DIABETES MELLITUS WITH HYPERGLYCEMIA (HCC): ICD-10-CM

## 2021-03-30 RX ORDER — SEMAGLUTIDE 1.34 MG/ML
INJECTION, SOLUTION SUBCUTANEOUS
Qty: 3 ML | Refills: 2 | Status: SHIPPED | OUTPATIENT
Start: 2021-03-30 | End: 2021-07-07

## 2021-04-09 ENCOUNTER — NON-PROVIDER VISIT (OUTPATIENT)
Dept: MEDICAL GROUP | Facility: PHYSICIAN GROUP | Age: 66
End: 2021-04-09
Payer: COMMERCIAL

## 2021-04-09 DIAGNOSIS — Z23 NEED FOR VACCINATION: ICD-10-CM

## 2021-04-09 PROCEDURE — 90471 IMMUNIZATION ADMIN: CPT | Performed by: INTERNAL MEDICINE

## 2021-04-09 PROCEDURE — 90750 HZV VACC RECOMBINANT IM: CPT | Performed by: INTERNAL MEDICINE

## 2021-04-09 NOTE — PROGRESS NOTES
"Maximo Ospina is a 65 y.o. male here for a non-provider visit for:   SHINGRIX (Shingles)    Reason for immunization: continue or complete series started at the office  Immunization records indicate need for vaccine: Yes, confirmed with Epic  Minimum interval has been met for this vaccine: Yes  ABN completed: Not Indicated    VIS Dated  10/30/2019 was given to patient: Yes  All IAC Questionnaire questions were answered \"No.\"    Patient tolerated injection and no adverse effects were observed or reported: Yes    Pt scheduled for next dose in series: Not Indicated    "

## 2021-04-16 ENCOUNTER — PHARMACY VISIT (OUTPATIENT)
Dept: PHARMACY | Facility: MEDICAL CENTER | Age: 66
End: 2021-04-16
Payer: COMMERCIAL

## 2021-04-16 PROCEDURE — RXMED WILLOW AMBULATORY MEDICATION CHARGE: Performed by: INTERNAL MEDICINE

## 2021-05-07 PROCEDURE — RXMED WILLOW AMBULATORY MEDICATION CHARGE: Performed by: INTERNAL MEDICINE

## 2021-05-14 ENCOUNTER — PHARMACY VISIT (OUTPATIENT)
Dept: PHARMACY | Facility: MEDICAL CENTER | Age: 66
End: 2021-05-14
Payer: COMMERCIAL

## 2021-05-18 ENCOUNTER — OFFICE VISIT (OUTPATIENT)
Dept: URGENT CARE | Facility: PHYSICIAN GROUP | Age: 66
End: 2021-05-18
Payer: COMMERCIAL

## 2021-05-18 VITALS
HEART RATE: 84 BPM | WEIGHT: 220 LBS | HEIGHT: 74 IN | BODY MASS INDEX: 28.23 KG/M2 | DIASTOLIC BLOOD PRESSURE: 60 MMHG | RESPIRATION RATE: 20 BRPM | SYSTOLIC BLOOD PRESSURE: 134 MMHG | TEMPERATURE: 98.1 F | OXYGEN SATURATION: 95 %

## 2021-05-18 DIAGNOSIS — R55 SYNCOPE, UNSPECIFIED SYNCOPE TYPE: ICD-10-CM

## 2021-05-18 PROCEDURE — 99203 OFFICE O/P NEW LOW 30 MIN: CPT | Performed by: STUDENT IN AN ORGANIZED HEALTH CARE EDUCATION/TRAINING PROGRAM

## 2021-05-18 ASSESSMENT — FIBROSIS 4 INDEX: FIB4 SCORE: 1.3

## 2021-05-19 NOTE — PROGRESS NOTES
Subjective:   CHIEF COMPLAINT  Chief Complaint   Patient presents with   • Fall     pt has BPPV. stood up, got dizzy and fell in kitchen. pain on R side of body, low back        HPI  Raheem Ospina is a 65 y.o. male with history of uncontrolled type 2 diabetes, hyperlipidemia, tobacco abuse, quadruple bypass presents with a chief complaint of a syncopal episode which happened earlier this morning.  Patient reports he has a history of BPPV said this morning around 11:00 he got up from a seated position in the family room and walked to the kitchen and suddenly felt dizzy and remembers falling to the ground hitting the right side of his head.  He is uncertain if he lost consciousness.  There is no postictal.  He did not urinate himself.  There is no preceding palpitations or chest pain.  Says the dizziness was similar to previous episodes of BPPV.  Now he is complaining of right rib and shoulder pain.  Says the symptoms are worse with deep breaths.  He tried taking NSAIDs which have not helped.  Patient was accompanied by his wife.    REVIEW OF SYSTEMS  General: no fever or chills  GI: no nausea or vomiting  See HPI for further details.    PAST MEDICAL HISTORY  Patient Active Problem List    Diagnosis Date Noted   • Diabetic peripheral neuropathy (HCC) 01/29/2021   • Erythrocytosis 01/29/2021   • Uncontrolled type 2 diabetes mellitus with hyperglycemia (HCC) 09/22/2020   • Dyslipidemia 09/22/2020   • Acquired hypothyroidism 09/22/2020   • Hypogonadism in male 09/22/2020   • Essential hypertension 09/22/2020       SURGICAL HISTORY   has a past surgical history that includes multiple coronary artery bypass.    ALLERGIES  Allergies   Allergen Reactions   • Benicar [Olmesartan]        CURRENT MEDICATIONS  Home Medications     Reviewed by Jeyson Drake Ass't (Medical Assistant) on 05/18/21 at 1654  Med List Status: <None>   Medication Last Dose Status   Ascorbic Acid (VITAMIN C) 1000 MG Tab Taking Active  "  aspirin EC (ECOTRIN) 325 MG Tablet Delayed Response Taking Active   atenolol (TENORMIN) 50 MG Tab  Active   atorvastatin (LIPITOR) 40 MG Tab Taking Active   Cholecalciferol (VITAMIN D) 50 MCG (2000 UT) Cap Taking Active   Coenzyme Q10 (CO Q 10 PO) Taking Active   Dapagliflozin Propanediol (FARXIGA) 10 MG Tab Taking Active   escitalopram (LEXAPRO) 10 MG Tab  Active   levothyroxine (SYNTHROID) 75 MCG Tab Taking Active   losartan (COZAAR) 50 MG Tab Taking Active   metFORMIN ER (GLUCOPHAGE XR) 500 MG TABLET SR 24 HR Taking Active   Omega-3 Fatty Acids (OMEGA-3 FISH OIL) 1200 MG Cap Taking Active   OZEMPIC, 1 MG/DOSE, 2 MG/1.5ML Solution Pen-injector  Active   pioglitazone (ACTOS) 30 MG Tab  Active   sildenafil citrate (VIAGRA) 100 MG tablet PRN Active   testosterone cypionate (DEPO-TESTOSTERONE) 200 MG/ML Solution injection  Active                SOCIAL HISTORY  Social History     Tobacco Use   • Smoking status: Current Every Day Smoker     Packs/day: 0.75     Years: 47.00     Pack years: 35.25     Types: Cigarettes   • Smokeless tobacco: Never Used   Substance and Sexual Activity   • Alcohol use: Not on file   • Drug use: Not on file   • Sexual activity: Not on file       FAMILY HISTORY  No family history on file.       Objective:   PHYSICAL EXAM  VITAL SIGNS: /60 (BP Location: Left arm, Patient Position: Sitting, BP Cuff Size: Small adult)   Pulse 84   Temp 36.7 °C (98.1 °F) (Temporal)   Resp 20   Ht 1.88 m (6' 2\")   Wt 99.8 kg (220 lb)   SpO2 95%   BMI 28.25 kg/m²     Gen: no acute distress, normal voice  Skin: dry, intact, moist mucosal membranes  Lungs: CTAB w/ symmetric expansion  CV: RRR w/o murmurs or clicks  Speech: conversant, fluent, no aphasia  Mental status: AAOx3  Gait: normal   CN: 2-12 grossly intact  Sensory exam: globally intact  Motor exam: no global deficits   Psych: normal affect, normal judgement, alert, awake    Assessment/Plan:     1. Syncope, unspecified syncope type     65 y.o. " male with history of uncontrolled type 2 diabetes, diabetic peripheral neuropathy, hypertension hyperlipidemia, tobacco abuse, quadruple bypass here s/p syncopal episode earlier this morning.  History suggestive of orthostasis however given the patient's age and past medical history he needs a higher level of evaluation than what can be provided in urgent care setting and instructed to go the emergency room.  Both the wife and patient understood and agreed and stated they will go the emergency room upon leaving urgent care.      Differential diagnosis, natural history, supportive care, and indications for immediate follow-up discussed. All questions answered. Patient agrees with the plan of care.    Follow-up as needed if symptoms worsen or fail to improve to PCP, Urgent care or Emergency Room.    Please note that this dictation was created using voice recognition software. I have made a reasonable attempt to correct obvious errors, but I expect that there are errors of grammar and possibly content that I did not discover before finalizing the note.

## 2021-05-21 ENCOUNTER — HOSPITAL ENCOUNTER (OUTPATIENT)
Dept: LAB | Facility: MEDICAL CENTER | Age: 66
End: 2021-05-21
Attending: INTERNAL MEDICINE
Payer: COMMERCIAL

## 2021-05-21 DIAGNOSIS — E55.9 VITAMIN D DEFICIENCY: ICD-10-CM

## 2021-05-21 DIAGNOSIS — E29.1 HYPOGONADISM IN MALE: ICD-10-CM

## 2021-05-21 DIAGNOSIS — E11.65 UNCONTROLLED TYPE 2 DIABETES MELLITUS WITH HYPERGLYCEMIA (HCC): ICD-10-CM

## 2021-05-21 DIAGNOSIS — E53.8 VITAMIN B12 DEFICIENCY: ICD-10-CM

## 2021-05-21 DIAGNOSIS — E78.5 DYSLIPIDEMIA: ICD-10-CM

## 2021-05-21 LAB
BASOPHILS # BLD AUTO: 0.8 % (ref 0–1.8)
BASOPHILS # BLD: 0.09 K/UL (ref 0–0.12)
EOSINOPHIL # BLD AUTO: 0.57 K/UL (ref 0–0.51)
EOSINOPHIL NFR BLD: 4.9 % (ref 0–6.9)
ERYTHROCYTE [DISTWIDTH] IN BLOOD BY AUTOMATED COUNT: 57.8 FL (ref 35.9–50)
EST. AVERAGE GLUCOSE BLD GHB EST-MCNC: 140 MG/DL
HBA1C MFR BLD: 6.5 % (ref 4–5.6)
HCT VFR BLD AUTO: 60 % (ref 42–52)
HGB BLD-MCNC: 18.8 G/DL (ref 14–18)
IMM GRANULOCYTES # BLD AUTO: 0.08 K/UL (ref 0–0.11)
IMM GRANULOCYTES NFR BLD AUTO: 0.7 % (ref 0–0.9)
LYMPHOCYTES # BLD AUTO: 2.55 K/UL (ref 1–4.8)
LYMPHOCYTES NFR BLD: 21.8 % (ref 22–41)
MCH RBC QN AUTO: 28.4 PG (ref 27–33)
MCHC RBC AUTO-ENTMCNC: 31.3 G/DL (ref 33.7–35.3)
MCV RBC AUTO: 90.6 FL (ref 81.4–97.8)
MONOCYTES # BLD AUTO: 1.17 K/UL (ref 0–0.85)
MONOCYTES NFR BLD AUTO: 10 % (ref 0–13.4)
NEUTROPHILS # BLD AUTO: 7.22 K/UL (ref 1.82–7.42)
NEUTROPHILS NFR BLD: 61.8 % (ref 44–72)
NRBC # BLD AUTO: 0 K/UL
NRBC BLD-RTO: 0 /100 WBC
PLATELET # BLD AUTO: 225 K/UL (ref 164–446)
PMV BLD AUTO: 10.5 FL (ref 9–12.9)
RBC # BLD AUTO: 6.62 M/UL (ref 4.7–6.1)
T3 SERPL-MCNC: 114 NG/DL (ref 60–181)
T3FREE SERPL-MCNC: 3.03 PG/ML (ref 2–4.4)
T4 FREE SERPL-MCNC: 1.43 NG/DL (ref 0.93–1.7)
TSH SERPL DL<=0.005 MIU/L-ACNC: 1.58 UIU/ML (ref 0.38–5.33)
VIT B12 SERPL-MCNC: 589 PG/ML (ref 211–911)
WBC # BLD AUTO: 11.7 K/UL (ref 4.8–10.8)

## 2021-05-21 PROCEDURE — 82570 ASSAY OF URINE CREATININE: CPT

## 2021-05-21 PROCEDURE — 84480 ASSAY TRIIODOTHYRONINE (T3): CPT

## 2021-05-21 PROCEDURE — 84270 ASSAY OF SEX HORMONE GLOBUL: CPT

## 2021-05-21 PROCEDURE — 80061 LIPID PANEL: CPT

## 2021-05-21 PROCEDURE — 36415 COLL VENOUS BLD VENIPUNCTURE: CPT

## 2021-05-21 PROCEDURE — 84403 ASSAY OF TOTAL TESTOSTERONE: CPT

## 2021-05-21 PROCEDURE — 84443 ASSAY THYROID STIM HORMONE: CPT

## 2021-05-21 PROCEDURE — 84481 FREE ASSAY (FT-3): CPT

## 2021-05-21 PROCEDURE — 84402 ASSAY OF FREE TESTOSTERONE: CPT

## 2021-05-21 PROCEDURE — 82043 UR ALBUMIN QUANTITATIVE: CPT

## 2021-05-21 PROCEDURE — 82607 VITAMIN B-12: CPT

## 2021-05-21 PROCEDURE — 84439 ASSAY OF FREE THYROXINE: CPT

## 2021-05-21 PROCEDURE — 82306 VITAMIN D 25 HYDROXY: CPT

## 2021-05-21 PROCEDURE — 85025 COMPLETE CBC W/AUTO DIFF WBC: CPT

## 2021-05-21 PROCEDURE — 83036 HEMOGLOBIN GLYCOSYLATED A1C: CPT

## 2021-05-22 LAB
CHOLEST SERPL-MCNC: 111 MG/DL (ref 100–199)
CREAT UR-MCNC: 55.35 MG/DL
FASTING STATUS PATIENT QL REPORTED: NORMAL
HDLC SERPL-MCNC: 29 MG/DL
LDLC SERPL CALC-MCNC: 45 MG/DL
MICROALBUMIN UR-MCNC: 42.3 MG/DL
MICROALBUMIN/CREAT UR: 764 MG/G (ref 0–30)
SHBG SERPL-SCNC: 27 NMOL/L (ref 11–80)
TESTOST FREE MFR SERPL: 2.4 % (ref 1.6–2.9)
TESTOST FREE SERPL-MCNC: 269 PG/ML (ref 47–244)
TESTOST SERPL-MCNC: 1125 NG/DL (ref 300–720)
TRIGL SERPL-MCNC: 183 MG/DL (ref 0–149)

## 2021-05-23 LAB — 25(OH)D3 SERPL-MCNC: 33 NG/ML (ref 30–80)

## 2021-06-11 ENCOUNTER — PHARMACY VISIT (OUTPATIENT)
Dept: PHARMACY | Facility: MEDICAL CENTER | Age: 66
End: 2021-06-11
Payer: COMMERCIAL

## 2021-06-11 PROCEDURE — RXMED WILLOW AMBULATORY MEDICATION CHARGE: Performed by: INTERNAL MEDICINE

## 2021-06-17 NOTE — PROGRESS NOTES
Virtual Visit: Established Patient   This visit was conducted via Zoom using secure and encrypted videoconferencing technology. The patient was in a private location in the state of [place].    The patient's identity was confirmed and verbal consent was obtained for this virtual visit.    Subjective:   CC:     Raheem Ospina is a 65 y.o. male presenting for evaluation and management of:      ROS   Denies any recent fevers or chills. No nausea or vomiting. No chest pains or shortness of breath.     Allergies   Allergen Reactions   • Benicar [Olmesartan]        Current medicines (including changes today)  Current Outpatient Medications   Medication Sig Dispense Refill   • testosterone cypionate (DEPO-TESTOSTERONE) 200 MG/ML Solution injection Inject 150 mg (0.75 ml ) intramuscularly every 14 days for 28 days 3 mL 3   • testosterone cypionate (DEPO-TESTOSTERONE) 200 MG/ML Solution injection Inject 200 mg ( 1 ml ) every 14 days for 28 days. 2 mL 5   • OZEMPIC, 1 MG/DOSE, 2 MG/1.5ML Solution Pen-injector INJECT 1 MG UNDER THE SKIN EVERY SEVEN DAYS. 3 mL 2   • escitalopram (LEXAPRO) 10 MG Tab TAKE 1 TAB BY MOUTH EVERY DAY. 90 tablet 0   • pioglitazone (ACTOS) 30 MG Tab TAKE ONE TABLET BY MOUTH DAILY 90 tablet 1   • atenolol (TENORMIN) 50 MG Tab TAKE 1 TAB BY MOUTH EVERY DAY. 90 Tab 2   • atorvastatin (LIPITOR) 40 MG Tab TAKE ONE TABLET BY MOUTH DAILY 90 Tab 2   • losartan (COZAAR) 50 MG Tab TAKE ONE TABLET BY MOUTH DAILY 90 Tab 2   • levothyroxine (SYNTHROID) 75 MCG Tab Take 1 Tab by mouth Every morning on an empty stomach. 90 Tab 2   • Cholecalciferol (VITAMIN D) 50 MCG (2000 UT) Cap Take  by mouth.     • Ascorbic Acid (VITAMIN C) 1000 MG Tab Take  by mouth.     • Omega-3 Fatty Acids (OMEGA-3 FISH OIL) 1200 MG Cap Take  by mouth.     • Dapagliflozin Propanediol (FARXIGA) 10 MG Tab Take 1 tablet by mouth every day. 90 Tab 3   • aspirin EC (ECOTRIN) 325 MG Tablet Delayed Response Take 325 mg by mouth every day.     •  metFORMIN ER (GLUCOPHAGE XR) 500 MG TABLET SR 24 HR Take 1,000 mg by mouth 2 times daily, before breakfast and dinner.     • sildenafil citrate (VIAGRA) 100 MG tablet Take 100 mg by mouth as needed.     • Coenzyme Q10 (CO Q 10 PO) Take  by mouth.       No current facility-administered medications for this visit.       Patient Active Problem List    Diagnosis Date Noted   • Diabetic peripheral neuropathy (HCC) 01/29/2021   • Erythrocytosis 01/29/2021   • Uncontrolled type 2 diabetes mellitus with hyperglycemia (HCC) 09/22/2020   • Dyslipidemia 09/22/2020   • Acquired hypothyroidism 09/22/2020   • Hypogonadism in male 09/22/2020   • Essential hypertension 09/22/2020       No family history on file.    He  has a past medical history of Cardiovascular disease, Coronary artery disease, Diabetes (HCC), Diabetic neuropathy (HCC), Hyperlipidemia, Hypertension, and Thyroid disease.  He  has a past surgical history that includes multiple coronary artery bypass.       Objective:   There were no vitals taken for this visit.    Physical Exam:  Constitutional: Alert, no distress, well-groomed.  Skin: No rashes in visible areas.  Eye: Round. Conjunctiva clear, lids normal. No icterus.   ENMT: Lips pink without lesions, good dentition, moist mucous membranes. Phonation normal.  Neck: No masses, no thyromegaly. Moves freely without pain.  Respiratory: Unlabored respiratory effort, no cough or audible wheeze  Psych: Alert and oriented x3, normal affect and mood.       Assessment and Plan:   The following treatment plan was discussed:     My total time spent caring for the patient on the day of the encounter was *** minutes.   This does not include time spent on separately billable procedures/tests.    Follow-up: No follow-ups on file.     Please note that this dictation was created using voice recognition software. I have made every reasonable attempt to correct obvious errors, but I expect that there are errors of grammar and  possibly content that I did not discover before finalizing the note.

## 2021-06-18 ENCOUNTER — OFFICE VISIT (OUTPATIENT)
Dept: MEDICAL GROUP | Facility: PHYSICIAN GROUP | Age: 66
End: 2021-06-18
Payer: COMMERCIAL

## 2021-06-18 VITALS
OXYGEN SATURATION: 97 % | DIASTOLIC BLOOD PRESSURE: 78 MMHG | TEMPERATURE: 97.9 F | HEIGHT: 74 IN | BODY MASS INDEX: 28.11 KG/M2 | HEART RATE: 80 BPM | WEIGHT: 219 LBS | SYSTOLIC BLOOD PRESSURE: 120 MMHG | RESPIRATION RATE: 16 BRPM

## 2021-06-18 DIAGNOSIS — I10 ESSENTIAL HYPERTENSION: ICD-10-CM

## 2021-06-18 DIAGNOSIS — E03.9 ACQUIRED HYPOTHYROIDISM: ICD-10-CM

## 2021-06-18 DIAGNOSIS — I25.10 CORONARY ARTERY DISEASE INVOLVING NATIVE CORONARY ARTERY OF NATIVE HEART WITHOUT ANGINA PECTORIS: ICD-10-CM

## 2021-06-18 DIAGNOSIS — E11.69 HYPERLIPIDEMIA ASSOCIATED WITH TYPE 2 DIABETES MELLITUS (HCC): ICD-10-CM

## 2021-06-18 DIAGNOSIS — F41.1 GAD (GENERALIZED ANXIETY DISORDER): ICD-10-CM

## 2021-06-18 DIAGNOSIS — E11.42 DIABETIC PERIPHERAL NEUROPATHY (HCC): ICD-10-CM

## 2021-06-18 DIAGNOSIS — E29.1 HYPOGONADISM IN MALE: ICD-10-CM

## 2021-06-18 DIAGNOSIS — D75.1 ERYTHROCYTOSIS: ICD-10-CM

## 2021-06-18 DIAGNOSIS — H81.10 BENIGN PAROXYSMAL POSITIONAL VERTIGO, UNSPECIFIED LATERALITY: ICD-10-CM

## 2021-06-18 DIAGNOSIS — E11.59 TYPE 2 DIABETES MELLITUS WITH OTHER CIRCULATORY COMPLICATION, WITHOUT LONG-TERM CURRENT USE OF INSULIN (HCC): ICD-10-CM

## 2021-06-18 DIAGNOSIS — E78.5 HYPERLIPIDEMIA ASSOCIATED WITH TYPE 2 DIABETES MELLITUS (HCC): ICD-10-CM

## 2021-06-18 PROBLEM — E11.9 TYPE 2 DIABETES MELLITUS (HCC): Status: ACTIVE | Noted: 2020-09-22

## 2021-06-18 PROCEDURE — 99214 OFFICE O/P EST MOD 30 MIN: CPT | Performed by: INTERNAL MEDICINE

## 2021-06-18 RX ORDER — PREGABALIN 75 MG/1
75 CAPSULE ORAL 3 TIMES DAILY
Qty: 270 CAPSULE | Refills: 0 | Status: SHIPPED | OUTPATIENT
Start: 2021-06-18 | End: 2021-09-16

## 2021-06-18 RX ORDER — ESCITALOPRAM OXALATE 10 MG/1
10 TABLET ORAL DAILY
Qty: 90 TABLET | Refills: 3 | Status: SHIPPED | OUTPATIENT
Start: 2021-06-18 | End: 2021-11-04

## 2021-06-18 ASSESSMENT — FIBROSIS 4 INDEX: FIB4 SCORE: 1.306549159836975698

## 2021-06-18 NOTE — LETTER
formerly [facility]  Kadi Torres M.D.  [address]  [place] [address]  Fax: [phone]   Authorization for Release/Disclosure of   Protected Health Information   Name: JONI MCKEON : 1955 SSN: [id number]   Address: [facility] [address]   [place] [address] Phone:    [phone] (home)    I authorize the entity listed below to release/disclose the PHI below to:   formerly [facility]/Kadi Torres M.D. and Kadi Torres M.D.   Provider or Entity Name:   Dr. Keysha Vizcaino  [address] [facility].     Address   [facility], [facility], [place] [address] Phone:  [phone]    Fax:  [phone]   Reason for request: continuity of care   Information to be released:    [  ] LAST COLONOSCOPY,  including any PATH REPORT and follow-up  [  ] LAST FIT/COLOGUARD RESULT [  ] LAST DEXA  [  ] LAST MAMMOGRAM  [  ] LAST PAP  [  ] LAST LABS [  ] RETINA EXAM REPORT  [  ] IMMUNIZATION RECORDS  [XX  ] Release all info      [  ] Check here and initial the line next to each item to release ALL health information INCLUDING  _____ Care and treatment for drug and / or alcohol abuse  _____ HIV testing, infection status, or AIDS  _____ Genetic Testing    DATES OF SERVICE OR TIME PERIOD TO BE DISCLOSED: _____________  I understand and acknowledge that:  * This Authorization may be revoked at any time by you in writing, except if your health information has already been used or disclosed.  * Your health information that will be used or disclosed as a result of you signing this authorization could be re-disclosed by the recipient. If this occurs, your re-disclosed health information may no longer be protected by State or Federal laws.  * You may refuse to sign this Authorization. Your refusal will not affect your ability to obtain treatment.  * This Authorization becomes effective upon signing and will  on (date) __________.      If no date is indicated, this Authorization will  one (1) year from the signature date.       Name: Raheem Yeungshirinak    Signature:   Date:     6/18/2021       PLEASE FAX REQUESTED RECORDS BACK TO: [phone]

## 2021-06-18 NOTE — PROGRESS NOTES
Subjective:     CC: Follow-up on urgent care visit    HPI:   Raheem presents today to discuss the following issues:    The patient was seen in urgent care on 5/18/2021 for syncope/fall from ground-level.  He has a history of BPPV, had got up from a seated position, felt dizzy, and then fell to the ground hitting the right side of his head.  The dizziness felt similar to his previous BPPV episodes.  At the visit, he complained of right-sided rib and shoulder pain.  The differential during the urgent care visit was BPPV versus orthostatic symptoms.    Today, the patient states that his episode will was very consistent with BPPV.  He said when he stood up he had the spinning sensation that required him to hold onto the countertop.  He has a known diagnosis of this condition for which he does the Epley maneuver as needed.  He states he had a full cardiac work-up August 2020 including a nuclear stress test, EKG, echocardiogram.  This was done in [place], we are trying to obtain the records.  He denies any further episodes.  He denies any vision changes, lightheadedness, chest pain, shortness of breath.        Past Medical History:   Diagnosis Date   • Cardiovascular disease    • Coronary artery disease    • Diabetes (HCC)    • Diabetic neuropathy (HCC)    • Hyperlipidemia    • Hypertension    • Thyroid disease        Social History     Tobacco Use   • Smoking status: Current Every Day Smoker     Packs/day: 0.75     Years: 47.00     Pack years: 35.25     Types: Cigarettes   • Smokeless tobacco: Never Used   Substance Use Topics   • Alcohol use: Not on file   • Drug use: Not on file       Current Outpatient Medications Ordered in Epic   Medication Sig Dispense Refill   • escitalopram (LEXAPRO) 10 MG Tab Take 1 tablet by mouth every day. 90 tablet 3   • pregabalin (LYRICA) 75 MG Cap Take 1 capsule by mouth 3 times a day for 90 days. 270 capsule 0   • testosterone cypionate (DEPO-TESTOSTERONE) 200 MG/ML Solution injection  "Inject 150 mg (0.75 ml ) intramuscularly every 14 days for 28 days 3 mL 3   • OZEMPIC, 1 MG/DOSE, 2 MG/1.5ML Solution Pen-injector INJECT 1 MG UNDER THE SKIN EVERY SEVEN DAYS. 3 mL 2   • pioglitazone (ACTOS) 30 MG Tab TAKE ONE TABLET BY MOUTH DAILY 90 tablet 1   • atenolol (TENORMIN) 50 MG Tab TAKE 1 TAB BY MOUTH EVERY DAY. 90 Tab 2   • atorvastatin (LIPITOR) 40 MG Tab TAKE ONE TABLET BY MOUTH DAILY 90 Tab 2   • losartan (COZAAR) 50 MG Tab TAKE ONE TABLET BY MOUTH DAILY 90 Tab 2   • levothyroxine (SYNTHROID) 75 MCG Tab Take 1 Tab by mouth Every morning on an empty stomach. 90 Tab 2   • Cholecalciferol (VITAMIN D) 50 MCG (2000 UT) Cap Take  by mouth.     • Ascorbic Acid (VITAMIN C) 1000 MG Tab Take  by mouth.     • Omega-3 Fatty Acids (OMEGA-3 FISH OIL) 1200 MG Cap Take  by mouth.     • Dapagliflozin Propanediol (FARXIGA) 10 MG Tab Take 1 tablet by mouth every day. 90 Tab 3   • aspirin EC (ECOTRIN) 325 MG Tablet Delayed Response Take 325 mg by mouth every day.     • metFORMIN ER (GLUCOPHAGE XR) 500 MG TABLET SR 24 HR Take 1,000 mg by mouth 2 times daily, before breakfast and dinner.     • sildenafil citrate (VIAGRA) 100 MG tablet Take 100 mg by mouth as needed.     • Coenzyme Q10 (CO Q 10 PO) Take  by mouth.     • testosterone cypionate (DEPO-TESTOSTERONE) 200 MG/ML Solution injection Inject 200 mg ( 1 ml ) every 14 days for 28 days. (Patient not taking: Reported on 6/18/2021) 2 mL 5     No current [facility]-ordered facility-administered medications on file.       Allergies:  Benicar [olmesartan]    Health Maintenance: Completed    ROS:   Gen: no fevers/chills  Pulm: no sob, no cough  CV: no chest pain, no palpitations  GI: no nausea/vomiting, no diarrhea  Neuro: no headaches, no numbness/tingling      Objective:       Exam:  /78   Pulse 80   Temp 36.6 °C (97.9 °F)   Resp 16   Ht 1.88 m (6' 2\")   Wt 99.3 kg (219 lb)   SpO2 97%   BMI 28.12 kg/m²  Body mass index is 28.12 kg/m².    Gen: Alert and oriented, No " apparent distress.  Lungs: Normal effort, CTA bilaterally, no wheezes, rhonchi, or rales  CV: Regular rate and rhythm. No murmurs, rubs, or gallops.  Ext: No clubbing, cyanosis, edema.      Assessment & Plan:     65 y.o. male with the following -     Coronary artery disease involving native coronary artery of native heart without angina pectoris  Mixed hyperlipidemia  This is a chronic condition.  Well-controlled.   The patient had four-vessel CABG in 2007.  Reports recent cardiac work-up August, 2020, including nuclear stress testing, echocardiogram and EKG.  This was done in [place], [place].  We will attempt to obtain those records.  Lipid panel from 5/21/2021 showed a total cholesterol of 111, LDL 45, HDL 29, triglycerides 183. The patient is on atorvastatin 40 mg daily.  He denies myalgias associated with the medication.  -Continue atorvastatin 40 mg nightly  -Obtain full cardiac records from [place], [place]      Acquired hypothyroidism  This is a chronic condition.  Well-controlled.   TSH from 5/21/2021 was 1.58. He is on levothyroxine 75 mcg daily.  His symptoms are well controlled on this dose.  Patient is followed by Dr. Mas in Endocrinology.  -Continue levothyroxine 75 mcg daily     Essential hypertension  This is a chronic condition.  Well-controlled. The patient is on atenolol 50 mg daily, losartan 50 mg daily.  His blood pressure is normal at today's visit, 122/82, with a pulse of 78.  He denies any headache, chest pain, shortness of breath, or lower extremity edema.  -Continue atenolol 50 mg daily  -Continue losartan 50 mg daily    Diabetic peripheral neuropathy (HCC)  This is a chronic condition.  Stable. The patient reports bilateral foot numbness and tingling.  He is on pregabalin 75 mg 3 times daily.    -Continue pregabalin 75 mg 3 times daily   - pregabalin (LYRICA) 75 MG Cap; Take 1 capsule by mouth 3 times a day for 90 days.  Dispense: 270 capsule; Refill: 0    Hypogonadism in male  This is a  chronic condition.  Well-controlled. The patient is on testosterone 150 mg every 2 weeks.  Testosterone from 12/11/2020 was elevated at 853, the patient states his testosterone had been reduced by Dr. Mas following that level.  However, repeat testosterone level on 5/21/2021 was further elevated to 1125.  -Continue testosterone 150 mg every 2 weeks, managed by endocrinology    Erythrocytosis  This is a chronic condition.  Recurrent.  Secondary to TRT.  The patient had an elevated hemoglobin and hematocrit of 18.2 and 59.2 on 12/11/2020.  The patient underwent phlebotomy at the [facility] center with repeat POCT hemoglobin and hematocrit of 21.4 and 63 on 2/4/2021.  His most recent CBC on 5/21/2021 shows a hemoglobin and hematocrit of 18.8 and 60. Every two months, last 3 days ago.  -Continue phlebotomy every 2 months, managed by endocrinology     Uncontrolled type 2 diabetes mellitus with hyperglycemia (HCC)  This is a chronic condition.   Stable.  Hemoglobin A1c from 5/21/2021 was elevated at 6.5.  Patient is followed by Dr. Mas in endocrinology.  He is on Farxiga 10 mg daily, Metformin 1000 mg twice daily, pioglitazone 30 mg daily, Ozempic 1 mg every 7 days.  -Continue Metformin 1000 mg twice daily  -Continue pioglitazone 30 mg daily  -Continue Ozempic 1 mg every 7 days  -Continue Farxiga 10 mg daily    HAYLEY (generalized anxiety disorder)  This is a chronic condition.  Well-controlled.  The patient is on Escitalopram 10 mg daily.  -Continue Escitalopram 10 mg daily  - escitalopram (LEXAPRO) 10 MG Tab; Take 1 tablet by mouth every day.  Dispense: 90 tablet; Refill: 3    Benign paroxysmal positional vertigo, unspecified laterality  This is a chronic condition.  Recurrent.  The patient performs the Epley maneuver on an as-needed basis.      Return in about 3 months (around 9/18/2021) for Controlled Substance.    Please note that this dictation was created using voice recognition software. I have made every  reasonable attempt to correct obvious errors, but I expect that there are errors of grammar and possibly content that I did not discover before finalizing the note.

## 2021-07-07 DIAGNOSIS — E11.65 UNCONTROLLED TYPE 2 DIABETES MELLITUS WITH HYPERGLYCEMIA (HCC): ICD-10-CM

## 2021-07-07 RX ORDER — METFORMIN HYDROCHLORIDE 500 MG/1
TABLET, EXTENDED RELEASE ORAL
Qty: 360 TABLET | Refills: 1 | Status: SHIPPED | OUTPATIENT
Start: 2021-07-07

## 2021-07-07 RX ORDER — SEMAGLUTIDE 1.34 MG/ML
INJECTION, SOLUTION SUBCUTANEOUS
Qty: 3 ML | Refills: 1 | Status: SHIPPED | OUTPATIENT
Start: 2021-07-07 | End: 2022-11-04

## 2021-07-07 RX ORDER — LOSARTAN POTASSIUM 50 MG/1
TABLET ORAL
Qty: 90 TABLET | Refills: 1 | Status: SHIPPED | OUTPATIENT
Start: 2021-07-07

## 2021-07-07 RX ORDER — PIOGLITAZONEHYDROCHLORIDE 30 MG/1
TABLET ORAL
Qty: 90 TABLET | Refills: 0 | Status: SHIPPED | OUTPATIENT
Start: 2021-07-07

## 2021-07-07 NOTE — TELEPHONE ENCOUNTER
Received request via: Pharmacy    Was the patient seen in the last year in this department? Yes    Does the patient have an active prescription (recently filled or refills available) for medication(s) requested? No     REQUESTED MEDICATION:   Requested Prescriptions     Pending Prescriptions Disp Refills   • losartan (COZAAR) 50 MG Tab [Pharmacy Med Name: LOSARTAN POTASSIUM 50MG TAB] 90 tablet 1     Sig: TAKE ONE TABLET BY MOUTH DAILY   • metFORMIN ER (GLUCOPHAGE XR) 500 MG TABLET SR 24 HR [Pharmacy Med Name: METFORMIN HYD ER 500MG TAB] 360 tablet 1     Sig: TAKE TWO TABLETS BY MOUTH TWICE A DAY   • OZEMPIC, 1 MG/DOSE, 2 MG/1.5ML Solution Pen-injector [Pharmacy Med Name: OZEMPIC 1MG/DOSE SOL 1.34MG/ML INJ] 3 mL 1     Sig: INJECT 1 MG UNDER THE SKIN EVERY SEVEN DAYS.   • pioglitazone (ACTOS) 30 MG Tab [Pharmacy Med Name: PIOGLITAZONE HYDROCH 30MG TAB] 90 tablet 0     Sig: TAKE ONE TABLET BY MOUTH DAILY

## 2021-07-09 ENCOUNTER — PHARMACY VISIT (OUTPATIENT)
Dept: PHARMACY | Facility: MEDICAL CENTER | Age: 66
End: 2021-07-09
Payer: COMMERCIAL

## 2021-07-09 PROCEDURE — RXMED WILLOW AMBULATORY MEDICATION CHARGE: Performed by: INTERNAL MEDICINE

## 2021-08-05 PROCEDURE — RXMED WILLOW AMBULATORY MEDICATION CHARGE: Performed by: INTERNAL MEDICINE

## 2021-08-06 ENCOUNTER — PHARMACY VISIT (OUTPATIENT)
Dept: PHARMACY | Facility: MEDICAL CENTER | Age: 66
End: 2021-08-06
Payer: COMMERCIAL

## 2021-08-24 ENCOUNTER — HOSPITAL ENCOUNTER (OUTPATIENT)
Dept: LAB | Facility: MEDICAL CENTER | Age: 66
End: 2021-08-24
Attending: INTERNAL MEDICINE
Payer: COMMERCIAL

## 2021-08-24 LAB
CREAT UR-MCNC: 79.42 MG/DL
EST. AVERAGE GLUCOSE BLD GHB EST-MCNC: 140 MG/DL
HBA1C MFR BLD: 6.5 % (ref 4–5.6)
HGB BLD-MCNC: 17.2 G/DL (ref 14–18)
MICROALBUMIN UR-MCNC: 30.9 MG/DL
MICROALBUMIN/CREAT UR: 389 MG/G (ref 0–30)

## 2021-08-24 PROCEDURE — 36415 COLL VENOUS BLD VENIPUNCTURE: CPT

## 2021-08-24 PROCEDURE — 84270 ASSAY OF SEX HORMONE GLOBUL: CPT

## 2021-08-24 PROCEDURE — 83036 HEMOGLOBIN GLYCOSYLATED A1C: CPT

## 2021-08-24 PROCEDURE — 82570 ASSAY OF URINE CREATININE: CPT

## 2021-08-24 PROCEDURE — 84403 ASSAY OF TOTAL TESTOSTERONE: CPT

## 2021-08-24 PROCEDURE — 82043 UR ALBUMIN QUANTITATIVE: CPT

## 2021-08-24 PROCEDURE — 84402 ASSAY OF FREE TESTOSTERONE: CPT

## 2021-08-24 PROCEDURE — 85018 HEMOGLOBIN: CPT

## 2021-08-26 LAB
SHBG SERPL-SCNC: 34 NMOL/L (ref 11–80)
TESTOST FREE MFR SERPL: 2 % (ref 1.6–2.9)
TESTOST FREE SERPL-MCNC: 167 PG/ML (ref 47–244)
TESTOST SERPL-MCNC: 825 NG/DL (ref 300–720)

## 2021-09-03 ENCOUNTER — PHARMACY VISIT (OUTPATIENT)
Dept: PHARMACY | Facility: MEDICAL CENTER | Age: 66
End: 2021-09-03
Payer: COMMERCIAL

## 2021-09-03 PROCEDURE — RXMED WILLOW AMBULATORY MEDICATION CHARGE: Performed by: INTERNAL MEDICINE

## 2021-09-21 ENCOUNTER — TELEPHONE (OUTPATIENT)
Dept: MEDICAL GROUP | Facility: PHYSICIAN GROUP | Age: 66
End: 2021-09-21

## 2021-09-21 ENCOUNTER — OFFICE VISIT (OUTPATIENT)
Dept: MEDICAL GROUP | Facility: PHYSICIAN GROUP | Age: 66
End: 2021-09-21
Payer: COMMERCIAL

## 2021-09-21 VITALS
HEART RATE: 74 BPM | BODY MASS INDEX: 28.11 KG/M2 | OXYGEN SATURATION: 97 % | RESPIRATION RATE: 16 BRPM | SYSTOLIC BLOOD PRESSURE: 120 MMHG | WEIGHT: 219 LBS | HEIGHT: 74 IN | DIASTOLIC BLOOD PRESSURE: 82 MMHG | TEMPERATURE: 98.2 F

## 2021-09-21 DIAGNOSIS — E11.69 DYSLIPIDEMIA ASSOCIATED WITH TYPE 2 DIABETES MELLITUS (HCC): ICD-10-CM

## 2021-09-21 DIAGNOSIS — I10 ESSENTIAL HYPERTENSION: ICD-10-CM

## 2021-09-21 DIAGNOSIS — E29.1 HYPOGONADISM IN MALE: ICD-10-CM

## 2021-09-21 DIAGNOSIS — E03.9 ACQUIRED HYPOTHYROIDISM: ICD-10-CM

## 2021-09-21 DIAGNOSIS — Z23 NEED FOR VACCINATION: ICD-10-CM

## 2021-09-21 DIAGNOSIS — F41.1 GAD (GENERALIZED ANXIETY DISORDER): ICD-10-CM

## 2021-09-21 DIAGNOSIS — I25.10 CORONARY ARTERY DISEASE INVOLVING NATIVE CORONARY ARTERY OF NATIVE HEART WITHOUT ANGINA PECTORIS: ICD-10-CM

## 2021-09-21 DIAGNOSIS — E11.42 DIABETIC PERIPHERAL NEUROPATHY (HCC): ICD-10-CM

## 2021-09-21 DIAGNOSIS — E78.5 DYSLIPIDEMIA ASSOCIATED WITH TYPE 2 DIABETES MELLITUS (HCC): ICD-10-CM

## 2021-09-21 DIAGNOSIS — D75.1 ERYTHROCYTOSIS: ICD-10-CM

## 2021-09-21 PROCEDURE — 99214 OFFICE O/P EST MOD 30 MIN: CPT | Mod: 25 | Performed by: INTERNAL MEDICINE

## 2021-09-21 PROCEDURE — 90662 IIV NO PRSV INCREASED AG IM: CPT | Performed by: INTERNAL MEDICINE

## 2021-09-21 PROCEDURE — 90471 IMMUNIZATION ADMIN: CPT | Performed by: INTERNAL MEDICINE

## 2021-09-21 RX ORDER — SEMAGLUTIDE 1.34 MG/ML
1 INJECTION, SOLUTION SUBCUTANEOUS
COMMUNITY
Start: 2021-09-20 | End: 2022-11-04

## 2021-09-21 RX ORDER — PREGABALIN 150 MG/1
150 CAPSULE ORAL 3 TIMES DAILY
Qty: 90 CAPSULE | Refills: 2 | Status: SHIPPED | OUTPATIENT
Start: 2021-09-21 | End: 2021-10-21

## 2021-09-21 RX ORDER — ESCITALOPRAM OXALATE 5 MG/1
5 TABLET ORAL DAILY
Qty: 90 TABLET | Refills: 0 | Status: SHIPPED | OUTPATIENT
Start: 2021-09-21 | End: 2021-11-04

## 2021-09-21 RX ORDER — PREGABALIN 75 MG/1
CAPSULE ORAL
COMMUNITY
Start: 2021-09-20 | End: 2021-09-21

## 2021-09-21 RX ORDER — PREGABALIN 150 MG/1
CAPSULE ORAL
COMMUNITY
Start: 2021-08-19 | End: 2021-11-30

## 2021-09-21 ASSESSMENT — FIBROSIS 4 INDEX: FIB4 SCORE: 1.32664991614215994

## 2021-09-21 NOTE — PATIENT INSTRUCTIONS
Alternate 10 mg and 5 mg doses for 2 weeks, then take 5 mg daily for 2 weeks, then take 5 mg every other day, then stop the medication.  If the anxiety returns, stay on the most previous dose and contact me.

## 2021-09-21 NOTE — LETTER
[facility]  Kadi Torres M.D.  [address]  [facility] [address]  Fax: [phone]   Authorization for Release/Disclosure of   Protected Health Information   Name: JONI MCKEON : 1955 SSN: [id number]   Address: [address] [place] [id number] Phone:    [phone] (home)    I authorize the entity listed below to release/disclose the PHI below to:   [facility]/Kadi Torres M.D. and Kadi Torres M.D.   Provider or Entity Name: Dr. Keysha Vizcaino  [facility]    Address [address] [place] [address]   Phone:[phone]      Fax:  [phone]   Reason for request: continuity of care   Information to be released:    [  ] LAST COLONOSCOPY,  including any PATH REPORT and follow-up  [  ] LAST FIT/COLOGUARD RESULT [  ] LAST DEXA  [  ] LAST MAMMOGRAM  [  ] LAST PAP  [  ] LAST LABS [  ] RETINA EXAM REPORT  [  ] IMMUNIZATION RECORDS  [XX  ] Release all info      [XX  ] Check here and initial the line next to each item to release ALL health information INCLUDING  _____ Care and treatment for drug and / or alcohol abuse  _____ HIV testing, infection status, or AIDS  _____ Genetic Testing    DATES OF SERVICE OR TIME PERIOD TO BE DISCLOSED: _____________  I understand and acknowledge that:  * This Authorization may be revoked at any time by you in writing, except if your health information has already been used or disclosed.  * Your health information that will be used or disclosed as a result of you signing this authorization could be re-disclosed by the recipient. If this occurs, your re-disclosed health information may no longer be protected by State or Federal laws.  * You may refuse to sign this Authorization. Your refusal will not affect your ability to obtain treatment.  * This Authorization becomes effective upon signing and will  on (date) __________.      If no date is indicated, this Authorization will   one (1) year from the signature date.    Name: Raheem Martinez Anai    Signature:   Date:     9/21/2021       PLEASE FAX REQUESTED RECORDS BACK TO: [phone]

## 2021-09-21 NOTE — PROGRESS NOTES
Subjective:     CC: Controlled substance visit    HPI:   Raheem presents today discuss the following issues:    In the interim since our last visit, the patient had his cataracts removed.  He reports his vision is much improved.    The patient states that he would like to try to get off of his Escitalopram.  He thinks his anxiety has improved and it is causing erectile dysfunction.    The patient also reports decreased energy and wonders if there is something he can take to increase his stamina and energy.  His previous provider did discuss Nuvigil.    He states that his testosterone dosage was recently decreased to 75 mg every 2 weeks because his testosterone level was elevated.    He would like for me to continue his DMV handicap placard from [place], which he has due to back related issues and heart disease.      Past Medical History:   Diagnosis Date   • Cardiovascular disease    • Coronary artery disease    • Diabetes (HCC)    • Diabetic neuropathy (HCC)    • Hyperlipidemia    • Hypertension    • Thyroid disease        Social History     Tobacco Use   • Smoking status: Current Every Day Smoker     Packs/day: 0.75     Years: 47.00     Pack years: 35.25     Types: Cigarettes   • Smokeless tobacco: Never Used   Substance Use Topics   • Alcohol use: Not on file   • Drug use: Not on file       Current Outpatient Medications Ordered in Epic   Medication Sig Dispense Refill   • pregabalin (LYRICA) 150 MG Cap      • OZEMPIC, 1 MG/DOSE, 4 MG/3ML Solution Pen-injector 1 mg.     • pregabalin (LYRICA) 150 MG Cap Take 1 Capsule by mouth 3 times a day for 30 days. 90 Capsule 2   • escitalopram (LEXAPRO) 5 MG tablet Take 1 Tablet by mouth every day. 90 Tablet 0   • losartan (COZAAR) 50 MG Tab TAKE ONE TABLET BY MOUTH DAILY 90 tablet 1   • metFORMIN ER (GLUCOPHAGE XR) 500 MG TABLET SR 24 HR TAKE TWO TABLETS BY MOUTH TWICE A  tablet 1   • OZEMPIC, 1 MG/DOSE, 2 MG/1.5ML Solution Pen-injector INJECT 1 MG UNDER THE SKIN  "EVERY SEVEN DAYS. 3 mL 1   • pioglitazone (ACTOS) 30 MG Tab TAKE ONE TABLET BY MOUTH DAILY 90 tablet 0   • escitalopram (LEXAPRO) 10 MG Tab Take 1 tablet by mouth every day. 90 tablet 3   • testosterone cypionate (DEPO-TESTOSTERONE) 200 MG/ML Solution injection Inject 150 mg (0.75 ml ) intramuscularly every 14 days for 28 days (Patient taking differently: Inject 75 mg into the shoulder, thigh, or buttocks.) 3 mL 3   • atenolol (TENORMIN) 50 MG Tab TAKE 1 TAB BY MOUTH EVERY DAY. 90 Tab 2   • atorvastatin (LIPITOR) 40 MG Tab TAKE ONE TABLET BY MOUTH DAILY 90 Tab 2   • levothyroxine (SYNTHROID) 75 MCG Tab Take 1 Tab by mouth Every morning on an empty stomach. 90 Tab 2   • Cholecalciferol (VITAMIN D) 50 MCG (2000 UT) Cap Take  by mouth.     • Ascorbic Acid (VITAMIN C) 1000 MG Tab Take  by mouth.     • Omega-3 Fatty Acids (OMEGA-3 FISH OIL) 1200 MG Cap Take  by mouth.     • Dapagliflozin Propanediol (FARXIGA) 10 MG Tab Take 1 tablet by mouth every day. 90 Tab 3   • aspirin EC (ECOTRIN) 325 MG Tablet Delayed Response Take 325 mg by mouth every day.     • sildenafil citrate (VIAGRA) 100 MG tablet Take 100 mg by mouth as needed.     • Coenzyme Q10 (CO Q 10 PO) Take  by mouth.       No current Epic-ordered facility-administered medications on file.       Allergies:  Benicar [olmesartan]    Health Maintenance: Completed    ROS:   Denies any recent fevers or chills. No nausea or vomiting. No chest pains or shortness of breath.      Objective:     Exam:  /82   Pulse 74   Temp 36.8 °C (98.2 °F)   Resp 16   Ht 1.88 m (6' 2\")   Wt 99.3 kg (219 lb)   SpO2 97%   BMI 28.12 kg/m²  Body mass index is 28.12 kg/m².    Gen: Alert and oriented, No apparent distress.  Lungs: Normal effort, CTA bilaterally, no wheezes, rhonchi, or rales  CV: Regular rate and rhythm. No murmurs, rubs, or gallops.  Ext: No clubbing, cyanosis, edema.        Assessment & Plan:     66 y.o. male with the following -     Dyslipidemia associated with " type 2 diabetes mellitus (HCC)  This is a chronic condition. With regard to the patient's type 2 diabetes, it is stable.  The patient is followed by endocrinology, Dr. Mas.  He is on Farxiga 10 mg daily, metformin 1000 mg twice daily, pioglitazone 30 mg daily, Ozempic 1 mg every 7 days. Hemoglobin A1c from 8/24/2021 was elevated at 6.5.  Urine microalbumin creatinine ratio was elevated at 389. With regard to the patient's dyslipidemia, it is well controlled.  The patient is on atorvastatin 40 mg nightly.  Lipid panel from 5/21/2021 showed a total cholesterol of 111, LDL 45, HDL 29, triglycerides 183.  -Continue metformin 1000 mg twice daily  -Continue pioglitazone 30 mg daily  -Continue Ozempic 1 mg every 7 days  -Continue Farxiga 10 mg daily  -Continue atorvastatin 40 mg nightly    Coronary artery disease involving native coronary artery of native heart without angina pectoris  This is a chronic condition.    Stable.  The patient had four-vessel CABG in 2007.  Reports recent cardiac work-up August, 2020, including nuclear stress testing, echocardiogram and EKG.  This was done in [place], [place].  We will attempt to obtain those records.  Lipid panel from 5/21/2021 showed a total cholesterol of 111, LDL 45, HDL 29, triglycerides 183. The patient is on atorvastatin 40 mg daily.    -Continue atorvastatin 40 mg nightly for dyslipidemia management  -Continue atenolol 50 mg daily and losartan 50 mg daily for blood pressure management    Diabetic peripheral neuropathy (HCC)  This is a chronic condition.  Stable. The patient reports bilateral foot numbness and tingling.  He is on pregabalin 150 mg 3 times daily.  Review of the  shows that he was last prescribed pregabalin 75 mg, dispo #60, for 30-day supply, on 9/20/2021.  He was also prescribed pregabalin 150 mg, dispo #90, for a 30-day supply, on 8/19/2021.  The patient states that he has not been getting 2 prescriptions and will clarify with his pharmacy regarding  the [facility].  Obtained and reviewed patient utilization report from [facility] pharmacy database on 9/21/2021 3:22 AM  prior to writing prescription for controlled substance II, III or IV per [place] bill . Based on assessment of the report, the prescription is medically necessary.   -Controlled substance treatment agreement and signed and scanned into the patient's chart on 9/21/2021  - pregabalin (LYRICA) 150 MG Cap; Take 1 Capsule by mouth 3 times a day for 30 days.  Dispense: 90 Capsule; Refill: 2     Acquired hypothyroidism  This is a chronic condition.  Well-controlled.   TSH from 5/21/2021 was 1.58. He is on levothyroxine 75 mcg daily.  His symptoms are well controlled on this dose.  Patient is followed by endocrinology, Dr. Mas.  -Continue levothyroxine 75 mcg daily     Essential hypertension  This is a chronic condition.  Well-controlled. The patient is on atenolol 50 mg daily, losartan 50 mg daily.  His blood pressure is normal at today's visit, 122/82, with a pulse of 78.  He denies any headache, chest pain, shortness of breath, or lower extremity edema.  -Continue atenolol 50 mg daily  -Continue losartan 50 mg daily     Hypogonadism in male  This is a chronic condition.  Well-controlled.  The patient is managed by endocrinology, Dr. Mas.  Testosterone level from 8/24/2021 was elevated at 825.  The patient's testosterone was subsequently reduced from 150 mg every 2 weeks to 75 mg every 2 weeks.  -Continue testosterone 75 mg every 2 weeks, managed by endocrinology    Erythrocytosis  This is a chronic condition.  Recurrent.  Secondary to TRT.    Hemoglobin on 8/24/2021 was normal at 17.2.  The patient undergoes phlebotomy every 2 months.   -Continue phlebotomy every 3 months, managed by endocrinology     HAYLEY (generalized anxiety disorder)  This is a chronic condition.    Improved. The patient is on escitalopram 10 mg daily.  He would like to stop this medication as his anxiety is improved and is  causing ED.  -Alternate escitalopram 10 mg and 5 mg every other day for 2 weeks, then take 5 mg daily for 2 weeks, then take 5 mg every other day for 2 weeks, then stop the medication  -If the patient has a return of his symptoms or other side effects he is to stay at his current dose and contact me  - escitalopram (LEXAPRO) 5 MG tablet; Take 1 Tablet by mouth every day.  Dispense: 90 Tablet; Refill: 0    Need for vaccination  - Influenza Vaccine, High Dose (65+ Only)    We will attempt to change cardiology and orthopedic records from [place].    Return in about 3 months (around 12/21/2021) for Controlled Substance.    Please note that this dictation was created using voice recognition software. I have made every reasonable attempt to correct obvious errors, but I expect that there are errors of grammar and possibly content that I did not discover before finalizing the note.

## 2021-10-01 ENCOUNTER — PHARMACY VISIT (OUTPATIENT)
Dept: PHARMACY | Facility: MEDICAL CENTER | Age: 66
End: 2021-10-01
Payer: COMMERCIAL

## 2021-10-01 PROCEDURE — RXMED WILLOW AMBULATORY MEDICATION CHARGE: Performed by: INTERNAL MEDICINE

## 2021-11-04 ENCOUNTER — OFFICE VISIT (OUTPATIENT)
Dept: MEDICAL GROUP | Facility: PHYSICIAN GROUP | Age: 66
End: 2021-11-04
Payer: COMMERCIAL

## 2021-11-04 VITALS
SYSTOLIC BLOOD PRESSURE: 122 MMHG | OXYGEN SATURATION: 97 % | DIASTOLIC BLOOD PRESSURE: 68 MMHG | TEMPERATURE: 98.1 F | HEIGHT: 74 IN | HEART RATE: 75 BPM | BODY MASS INDEX: 28.11 KG/M2 | RESPIRATION RATE: 14 BRPM | WEIGHT: 219 LBS

## 2021-11-04 DIAGNOSIS — F41.1 GAD (GENERALIZED ANXIETY DISORDER): ICD-10-CM

## 2021-11-04 DIAGNOSIS — E11.42 DIABETIC PERIPHERAL NEUROPATHY (HCC): ICD-10-CM

## 2021-11-04 PROCEDURE — 99213 OFFICE O/P EST LOW 20 MIN: CPT | Performed by: INTERNAL MEDICINE

## 2021-11-04 PROCEDURE — RXMED WILLOW AMBULATORY MEDICATION CHARGE: Performed by: INTERNAL MEDICINE

## 2021-11-04 ASSESSMENT — FIBROSIS 4 INDEX: FIB4 SCORE: 1.32664991614215994

## 2021-11-04 NOTE — PROGRESS NOTES
Subjective:     CC: Follow-up and medication adjustments      HPI:   Raheem presents today to discuss the following issues:    The patient states he was able to successfully titrate off of his escitalopram.  He has now been completely off of the medication for about 7 to 10 days.  He reports a little bit more anxiety, but nothing really significant.  He also states he has cut his pregabalin in half.  He has not noticed any changes in pain.  He is now on 75 mg 3 times daily.    Past Medical History:   Diagnosis Date   • Cardiovascular disease    • Coronary artery disease    • Diabetes (HCC)    • Diabetic neuropathy (HCC)    • Hyperlipidemia    • Hypertension    • Thyroid disease        Social History     Tobacco Use   • Smoking status: Current Every Day Smoker     Packs/day: 0.75     Years: 47.00     Pack years: 35.25     Types: Cigarettes   • Smokeless tobacco: Never Used   Substance Use Topics   • Alcohol use: Not on file   • Drug use: Not on file       Current Outpatient Medications Ordered in Epic   Medication Sig Dispense Refill   • pregabalin (LYRICA) 150 MG Cap      • OZEMPIC, 1 MG/DOSE, 4 MG/3ML Solution Pen-injector 1 mg.     • losartan (COZAAR) 50 MG Tab TAKE ONE TABLET BY MOUTH DAILY 90 tablet 1   • metFORMIN ER (GLUCOPHAGE XR) 500 MG TABLET SR 24 HR TAKE TWO TABLETS BY MOUTH TWICE A  tablet 1   • OZEMPIC, 1 MG/DOSE, 2 MG/1.5ML Solution Pen-injector INJECT 1 MG UNDER THE SKIN EVERY SEVEN DAYS. 3 mL 1   • pioglitazone (ACTOS) 30 MG Tab TAKE ONE TABLET BY MOUTH DAILY 90 tablet 0   • testosterone cypionate (DEPO-TESTOSTERONE) 200 MG/ML Solution injection Inject 150 mg (0.75 ml ) intramuscularly every 14 days for 28 days (Patient taking differently: Inject 75 mg into the shoulder, thigh, or buttocks.) 3 mL 3   • atenolol (TENORMIN) 50 MG Tab TAKE 1 TAB BY MOUTH EVERY DAY. 90 Tab 2   • atorvastatin (LIPITOR) 40 MG Tab TAKE ONE TABLET BY MOUTH DAILY 90 Tab 2   • levothyroxine (SYNTHROID) 75 MCG Tab Take  "1 Tab by mouth Every morning on an empty stomach. 90 Tab 2   • Cholecalciferol (VITAMIN D) 50 MCG (2000 UT) Cap Take  by mouth.     • Ascorbic Acid (VITAMIN C) 1000 MG Tab Take  by mouth.     • Omega-3 Fatty Acids (OMEGA-3 FISH OIL) 1200 MG Cap Take  by mouth.     • Dapagliflozin Propanediol (FARXIGA) 10 MG Tab Take 1 tablet by mouth every day. 90 Tab 3   • aspirin EC (ECOTRIN) 325 MG Tablet Delayed Response Take 325 mg by mouth every day.     • sildenafil citrate (VIAGRA) 100 MG tablet Take 100 mg by mouth as needed.     • Coenzyme Q10 (CO Q 10 PO) Take  by mouth.     • escitalopram (LEXAPRO) 5 MG tablet Take 1 Tablet by mouth every day. (Patient not taking: Reported on 11/4/2021) 90 Tablet 0   • escitalopram (LEXAPRO) 10 MG Tab Take 1 tablet by mouth every day. (Patient not taking: Reported on 11/4/2021) 90 tablet 3     No current Epic-ordered facility-administered medications on file.       Allergies:  Benicar [olmesartan]    Health Maintenance: Completed    ROS:    Per HPI      Objective:       Exam:  /68   Pulse 75   Temp 36.7 °C (98.1 °F)   Resp 14   Ht 1.88 m (6' 2\")   Wt 99.3 kg (219 lb)   SpO2 97%   BMI 28.12 kg/m²  Body mass index is 28.12 kg/m².    Gen: Alert and oriented, No apparent distress.      Assessment & Plan:     66 y.o. male with the following -     HAYLEY (generalized anxiety disorder)  This is a chronic condition.  Stable.  The patient has recently titrated himself off of escitalopram 10 mg daily.  He is doing well.  He did not have much difficulty coming off medication.    Diabetic peripheral neuropathy (HCC)  This is a chronic condition.  Stable. The patient reports bilateral foot numbness and tingling.  He has recently reduced his dose from 150 mg 3 times daily to 75 mg 3 times daily.  He has not noticed any changes in his pain on the lower dose.    -Controlled substance treatment agreement and signed and scanned into the patient's chart on 9/21/2021   -Continue pregabalin 75 mg 3 " times daily    I spent a total of 25 minutes with record review, exam, communication with the patient, communication with other providers, and documentation of this encounter.      Return in about 3 months (around 2/4/2022) for Controlled Substance.    Please note that this dictation was created using voice recognition software. I have made every reasonable attempt to correct obvious errors, but I expect that there are errors of grammar and possibly content that I did not discover before finalizing the note.

## 2021-11-12 ENCOUNTER — PHARMACY VISIT (OUTPATIENT)
Dept: PHARMACY | Facility: MEDICAL CENTER | Age: 66
End: 2021-11-12
Payer: COMMERCIAL

## 2021-11-30 DIAGNOSIS — E11.42 DIABETIC PERIPHERAL NEUROPATHY (HCC): ICD-10-CM

## 2021-11-30 RX ORDER — PREGABALIN 75 MG/1
75 CAPSULE ORAL 3 TIMES DAILY
Qty: 90 CAPSULE | Refills: 2 | Status: SHIPPED | OUTPATIENT
Start: 2021-11-30 | End: 2021-12-30

## 2021-11-30 RX ORDER — PREGABALIN 150 MG/1
CAPSULE ORAL
Qty: 30 CAPSULE | Status: CANCELLED | OUTPATIENT
Start: 2021-11-30

## 2021-12-13 PROCEDURE — RXMED WILLOW AMBULATORY MEDICATION CHARGE: Performed by: INTERNAL MEDICINE

## 2021-12-15 ENCOUNTER — PHARMACY VISIT (OUTPATIENT)
Dept: PHARMACY | Facility: MEDICAL CENTER | Age: 66
End: 2021-12-15
Payer: COMMERCIAL

## 2021-12-20 ENCOUNTER — HOSPITAL ENCOUNTER (OUTPATIENT)
Dept: LAB | Facility: MEDICAL CENTER | Age: 66
End: 2021-12-20
Attending: INTERNAL MEDICINE
Payer: COMMERCIAL

## 2021-12-20 LAB
25(OH)D3 SERPL-MCNC: 40 NG/ML (ref 30–100)
CREAT UR-MCNC: 73.95 MG/DL
EST. AVERAGE GLUCOSE BLD GHB EST-MCNC: 154 MG/DL
HBA1C MFR BLD: 7 % (ref 4–5.6)
MICROALBUMIN UR-MCNC: 15 MG/DL
MICROALBUMIN/CREAT UR: 203 MG/G (ref 0–30)
T3 SERPL-MCNC: 107 NG/DL (ref 60–181)
T3FREE SERPL-MCNC: 3.03 PG/ML (ref 2–4.4)
T4 FREE SERPL-MCNC: 1.39 NG/DL (ref 0.93–1.7)
TSH SERPL DL<=0.005 MIU/L-ACNC: 1.23 UIU/ML (ref 0.38–5.33)
VIT B12 SERPL-MCNC: 1534 PG/ML (ref 211–911)

## 2021-12-20 PROCEDURE — 83036 HEMOGLOBIN GLYCOSYLATED A1C: CPT

## 2021-12-20 PROCEDURE — 84480 ASSAY TRIIODOTHYRONINE (T3): CPT

## 2021-12-20 PROCEDURE — 84443 ASSAY THYROID STIM HORMONE: CPT

## 2021-12-20 PROCEDURE — 36415 COLL VENOUS BLD VENIPUNCTURE: CPT

## 2021-12-20 PROCEDURE — 84403 ASSAY OF TOTAL TESTOSTERONE: CPT

## 2021-12-20 PROCEDURE — 82043 UR ALBUMIN QUANTITATIVE: CPT

## 2021-12-20 PROCEDURE — 82570 ASSAY OF URINE CREATININE: CPT

## 2021-12-20 PROCEDURE — 84481 FREE ASSAY (FT-3): CPT

## 2021-12-20 PROCEDURE — 84402 ASSAY OF FREE TESTOSTERONE: CPT

## 2021-12-20 PROCEDURE — 82306 VITAMIN D 25 HYDROXY: CPT

## 2021-12-20 PROCEDURE — 82607 VITAMIN B-12: CPT

## 2021-12-20 PROCEDURE — 84270 ASSAY OF SEX HORMONE GLOBUL: CPT

## 2021-12-20 PROCEDURE — 84439 ASSAY OF FREE THYROXINE: CPT

## 2021-12-22 LAB
SHBG SERPL-SCNC: 31 NMOL/L (ref 11–80)
TESTOST FREE MFR SERPL: 2.1 % (ref 1.6–2.9)
TESTOST FREE SERPL-MCNC: 188 PG/ML (ref 47–244)
TESTOST SERPL-MCNC: 877 NG/DL (ref 300–720)

## 2022-01-18 PROCEDURE — RXMED WILLOW AMBULATORY MEDICATION CHARGE: Performed by: INTERNAL MEDICINE

## 2022-01-19 ENCOUNTER — PHARMACY VISIT (OUTPATIENT)
Dept: PHARMACY | Facility: MEDICAL CENTER | Age: 67
End: 2022-01-19
Payer: COMMERCIAL

## 2022-02-03 RX ORDER — PREGABALIN 150 MG/1
CAPSULE ORAL
COMMUNITY
Start: 2021-11-30 | End: 2022-02-04

## 2022-02-03 NOTE — PROGRESS NOTES
Subjective:     CC:   Chief Complaint   Patient presents with   • Follow-Up     Medication, lyrica medication, pt want prio authorization for lyrica to take 3x times a day          HPI:   Raheem presents today to discuss the following issues:    The patient feels well.  No acute complaints.  He states he needs a creatinine patient to take his 75 mg 3 times daily.      Past Medical History:   Diagnosis Date   • Cardiovascular disease    • Coronary artery disease    • Diabetes (HCC)    • Diabetic neuropathy (HCC)    • Hyperlipidemia    • Hypertension    • Thyroid disease        Social History     Tobacco Use   • Smoking status: Current Every Day Smoker     Packs/day: 0.75     Years: 47.00     Pack years: 35.25     Types: Cigarettes   • Smokeless tobacco: Never Used   Vaping Use   • Vaping Use: Never used   Substance Use Topics   • Alcohol use: Not Currently   • Drug use: Yes     Types: Marijuana     Comment: pt uses THC 5mg tablets everning to sleep        Current Outpatient Medications Ordered in Epic   Medication Sig Dispense Refill   • pregabalin (LYRICA) 75 MG Cap Take 1 Capsule by mouth 3 times a day for 30 days. 90 Capsule 2   • testosterone cypionate (DEPO-TESTOSTERONE) 200 MG/ML Solution injection Inject 150 mg (0.75 ml ) intramuscularly every 14 days for 28 days 2 mL 5   • OZEMPIC, 1 MG/DOSE, 4 MG/3ML Solution Pen-injector 1 mg.     • losartan (COZAAR) 50 MG Tab TAKE ONE TABLET BY MOUTH DAILY 90 tablet 1   • metFORMIN ER (GLUCOPHAGE XR) 500 MG TABLET SR 24 HR TAKE TWO TABLETS BY MOUTH TWICE A  tablet 1   • OZEMPIC, 1 MG/DOSE, 2 MG/1.5ML Solution Pen-injector INJECT 1 MG UNDER THE SKIN EVERY SEVEN DAYS. 3 mL 1   • pioglitazone (ACTOS) 30 MG Tab TAKE ONE TABLET BY MOUTH DAILY 90 tablet 0   • atenolol (TENORMIN) 50 MG Tab TAKE 1 TAB BY MOUTH EVERY DAY. 90 Tab 2   • atorvastatin (LIPITOR) 40 MG Tab TAKE ONE TABLET BY MOUTH DAILY 90 Tab 2   • levothyroxine (SYNTHROID) 75 MCG Tab Take 1 Tab by mouth Every  "morning on an empty stomach. 90 Tab 2   • Cholecalciferol (VITAMIN D) 50 MCG (2000 UT) Cap Take  by mouth.     • Ascorbic Acid (VITAMIN C) 1000 MG Tab Take  by mouth.     • Omega-3 Fatty Acids (OMEGA-3 FISH OIL) 1200 MG Cap Take  by mouth.     • Dapagliflozin Propanediol (FARXIGA) 10 MG Tab Take 1 tablet by mouth every day. 90 Tab 3   • aspirin EC (ECOTRIN) 325 MG Tablet Delayed Response Take 325 mg by mouth every day.     • sildenafil citrate (VIAGRA) 100 MG tablet Take 100 mg by mouth as needed.     • Coenzyme Q10 (CO Q 10 PO) Take  by mouth.       No current Epic-ordered facility-administered medications on file.       Allergies:  Benicar [olmesartan]    Health Maintenance: Completed    ROS:   Denies any recent fevers or chills. No nausea or vomiting. No chest pains or shortness of breath.    Objective:       Exam:  /70   Pulse 72   Temp 36.3 °C (97.4 °F) (Temporal)   Resp 18   Ht 1.88 m (6' 2\")   Wt 105 kg (232 lb)   SpO2 98%   BMI 29.79 kg/m²  Body mass index is 29.79 kg/m².    Gen: Alert and oriented, No apparent distress.  Lungs: Normal effort, CTA bilaterally, no wheezes, rhonchi, or rales  CV: Regular rate and rhythm. No murmurs, rubs, or gallops.  Ext: No clubbing, cyanosis, edema.        Assessment & Plan:     66 y.o. male with the following -     Hyperlipidemia associated with type 2 diabetes mellitus (HCC)  With regard to the patient's type 2 diabetes, chronic with progression.  The patient is followed by endocrinology, Dr. Mas.  He is on Farxiga 10 mg daily, metformin 1000 mg twice daily, pioglitazone 30 mg daily, Ozempic 1 mg every 7 days.  Hemoglobin A1c from 12/20/2021 was 7, up from 6.5.  Urine microalbumin creatinine ratio was improved, but still elevated at 203.  -Continue metformin 1000 mg twice daily  -Continue pioglitazone 30 mg daily  -Continue Ozempic 1 mg every 7 days  -Continue Farxiga 10 mg daily    With regard to the patient's dyslipidemia, it is well controlled.  The " patient is on atorvastatin 40 mg nightly.  Lipid panel from 5/21/2021 showed a total cholesterol of 111, LDL 45, HDL 29, triglycerides 183.    -Continue atorvastatin 40 mg nightly   - Lipid Profile; Future    Diabetic peripheral neuropathy (HCC)  This is a chronic medical condition.  Stable. The patient reports bilateral foot numbness and tingling.  He has recently reduced his dose from 150 mg 3 times daily to 75 mg 3 times daily.  He has not noticed any changes in his pain on the lower dose.    Review of the patient's  shows that he was last prescribed pregabalin 75 mg, dispo #60, on 1/5/2022. Obtained and reviewed patient utilization report from [facility] pharmacy database on 2/4/2022 6:49 AM  prior to writing prescription for controlled substance II, III or IV per [place] bill . Based on assessment of the report, the prescription is medically necessary.   -Controlled substance treatment agreement and signed and scanned into the patient's chart on 9/21/2021   -Continue pregabalin 75 mg 3 times daily, needs preauthorization completed  - pregabalin (LYRICA) 75 MG Cap; Take 1 Capsule by mouth 3 times a day for 30 days.  Dispense: 90 Capsule; Refill: 2      Coronary artery disease involving native coronary artery of native heart without angina pectoris  This is a chronic medical condition.    Stable.  The patient had four-vessel CABG in 2007.  Reports recent cardiac work-up August, 2020, including nuclear stress testing, echocardiogram and EKG.  This was done in [place], [place].   Lipid panel from 5/21/2021 showed a total cholesterol of 111, LDL 45, HDL 29, triglycerides 183. The patient is on atorvastatin 40 mg daily.    -Continue atorvastatin 40 mg nightly for dyslipidemia management  -Continue atenolol 50 mg daily and losartan 50 mg daily for blood pressure management     Primary hypertension  This is a chronic medical condition.  Well-controlled. The patient is on atenolol 50 mg daily, losartan 50 mg  daily.  He denies new headaches, chest pain, shortness of breath, or lower extremity edema.  His blood pressure is normal today's visit.  -Continue atenolol 50 mg daily  -Continue losartan 50 mg daily     Acquired hypothyroidism  This is a chronic medical condition.  Well-controlled.  The patient is managed by endocrinology, Dr. Mas.  He is on levothyroxine 75 mcg daily.  TSH from 12/20/2021 was normal at 1.23.  He is clinically euthyroid.  -Continue levothyroxine 75 mcg daily    Hypogonadism in male  This is a chronic medical condition.  Well-controlled.  The patient is managed by endocrinology, Dr. Mas. The patient is taking 150 mg every 2 weeks. Testosterone level from 12/20/2021 was elevated at 877.   -Continue testosterone 150 mg every 2 weeks, managed by endocrinology  - PROSTATE SPECIFIC AG SCREENING; Future    Elevated hemoglobin (HCC)  This is a chronic medical condition.  Recurrent.  Secondary to TRT.  Hemoglobin on 8/24/2021 was normal at 17.2.  The patient undergoes phlebotomy every 2 months.   -Continue phlebotomy every 3 months, managed by endocrinology     Prostate cancer screening  - PROSTATE SPECIFIC AG SCREENING; Future    Return in about 3 months (around 5/4/2022) for Controlled Substance.    Please note that this dictation was created using voice recognition software. I have made every reasonable attempt to correct obvious errors, but I expect that there are errors of grammar and possibly content that I did not discover before finalizing the note.

## 2022-02-04 ENCOUNTER — OFFICE VISIT (OUTPATIENT)
Dept: MEDICAL GROUP | Facility: PHYSICIAN GROUP | Age: 67
End: 2022-02-04
Payer: COMMERCIAL

## 2022-02-04 VITALS
HEART RATE: 72 BPM | BODY MASS INDEX: 29.77 KG/M2 | RESPIRATION RATE: 18 BRPM | OXYGEN SATURATION: 98 % | WEIGHT: 232 LBS | TEMPERATURE: 97.4 F | DIASTOLIC BLOOD PRESSURE: 70 MMHG | SYSTOLIC BLOOD PRESSURE: 138 MMHG | HEIGHT: 74 IN

## 2022-02-04 DIAGNOSIS — D58.2 ELEVATED HEMOGLOBIN (HCC): ICD-10-CM

## 2022-02-04 DIAGNOSIS — I10 PRIMARY HYPERTENSION: ICD-10-CM

## 2022-02-04 DIAGNOSIS — Z12.5 PROSTATE CANCER SCREENING: ICD-10-CM

## 2022-02-04 DIAGNOSIS — E11.69 HYPERLIPIDEMIA ASSOCIATED WITH TYPE 2 DIABETES MELLITUS (HCC): ICD-10-CM

## 2022-02-04 DIAGNOSIS — E03.9 ACQUIRED HYPOTHYROIDISM: ICD-10-CM

## 2022-02-04 DIAGNOSIS — E29.1 HYPOGONADISM IN MALE: ICD-10-CM

## 2022-02-04 DIAGNOSIS — E11.42 DIABETIC PERIPHERAL NEUROPATHY (HCC): ICD-10-CM

## 2022-02-04 DIAGNOSIS — I25.10 CORONARY ARTERY DISEASE INVOLVING NATIVE CORONARY ARTERY OF NATIVE HEART WITHOUT ANGINA PECTORIS: ICD-10-CM

## 2022-02-04 DIAGNOSIS — E78.5 HYPERLIPIDEMIA ASSOCIATED WITH TYPE 2 DIABETES MELLITUS (HCC): ICD-10-CM

## 2022-02-04 PROCEDURE — 99214 OFFICE O/P EST MOD 30 MIN: CPT | Performed by: INTERNAL MEDICINE

## 2022-02-04 RX ORDER — PREGABALIN 75 MG/1
CAPSULE ORAL 2 TIMES DAILY
COMMUNITY
Start: 2022-01-05 | End: 2022-02-04

## 2022-02-04 RX ORDER — PREGABALIN 75 MG/1
75 CAPSULE ORAL 3 TIMES DAILY
Qty: 90 CAPSULE | Refills: 2 | Status: SHIPPED | OUTPATIENT
Start: 2022-02-04 | End: 2022-03-06

## 2022-02-04 ASSESSMENT — PATIENT HEALTH QUESTIONNAIRE - PHQ9: CLINICAL INTERPRETATION OF PHQ2 SCORE: 0

## 2022-02-04 ASSESSMENT — FIBROSIS 4 INDEX: FIB4 SCORE: 1.32664991614215994

## 2022-02-16 ENCOUNTER — PHARMACY VISIT (OUTPATIENT)
Dept: PHARMACY | Facility: MEDICAL CENTER | Age: 67
End: 2022-02-16
Payer: COMMERCIAL

## 2022-02-16 PROCEDURE — RXMED WILLOW AMBULATORY MEDICATION CHARGE: Performed by: INTERNAL MEDICINE

## 2022-03-16 ENCOUNTER — PHARMACY VISIT (OUTPATIENT)
Dept: PHARMACY | Facility: MEDICAL CENTER | Age: 67
End: 2022-03-16
Payer: COMMERCIAL

## 2022-03-16 PROCEDURE — RXMED WILLOW AMBULATORY MEDICATION CHARGE: Performed by: INTERNAL MEDICINE

## 2022-03-19 NOTE — PROGRESS NOTES
Subjective:     CC:   Chief Complaint   Patient presents with   • Coronavirus Screening     X 1 wk          HPI:   Raheem presents today to discuss the following issues:    The patient recently traveled to [place] with some friends.  They all became ill.  Home testing for COVID-19 has been negative on everyone so far.  He has had symptoms for 5 days.  His main symptom is a productive cough.  He also has some mild shortness of breath.  He has received the COVID-19 vaccination series.  He denies fevers, sore throat, headache, myalgias, loss of taste or smell, abdominal pain, nausea or vomiting, diarrhea.      Past Medical History:   Diagnosis Date   • Cardiovascular disease    • Coronary artery disease    • Diabetes (HCC)    • Diabetic neuropathy (HCC)    • Hyperlipidemia    • Hypertension    • Thyroid disease        Social History     Tobacco Use   • Smoking status: Current Every Day Smoker     Packs/day: 0.75     Years: 47.00     Pack years: 35.25     Types: Cigarettes   • Smokeless tobacco: Never Used   Vaping Use   • Vaping Use: Never used   Substance Use Topics   • Alcohol use: Not Currently   • Drug use: Yes     Types: Marijuana     Comment: pt uses THC 5mg tablets everning to sleep        Current Outpatient Medications Ordered in Epic   Medication Sig Dispense Refill   • azithromycin (ZITHROMAX) 250 MG Tab Take two tablets on the first day, then one tablet daily for 4 days. 6 Tablet 0   • methylPREDNISolone (MEDROL DOSEPAK) 4 MG Tablet Therapy Pack As directed on the packaging label. 21 Tablet 0   • testosterone cypionate (DEPO-TESTOSTERONE) 200 MG/ML Solution injection Inject 150 mg (0.75 ml ) intramuscularly every 14 days for 28 days 2 mL 5   • OZEMPIC, 1 MG/DOSE, 4 MG/3ML Solution Pen-injector 1 mg.     • losartan (COZAAR) 50 MG Tab TAKE ONE TABLET BY MOUTH DAILY 90 tablet 1   • metFORMIN ER (GLUCOPHAGE XR) 500 MG TABLET SR 24 HR TAKE TWO TABLETS BY MOUTH TWICE A  tablet 1   • OZEMPIC, 1 MG/DOSE, 2  "MG/1.5ML Solution Pen-injector INJECT 1 MG UNDER THE SKIN EVERY SEVEN DAYS. 3 mL 1   • pioglitazone (ACTOS) 30 MG Tab TAKE ONE TABLET BY MOUTH DAILY 90 tablet 0   • atenolol (TENORMIN) 50 MG Tab TAKE 1 TAB BY MOUTH EVERY DAY. 90 Tab 2   • atorvastatin (LIPITOR) 40 MG Tab TAKE ONE TABLET BY MOUTH DAILY 90 Tab 2   • levothyroxine (SYNTHROID) 75 MCG Tab Take 1 Tab by mouth Every morning on an empty stomach. 90 Tab 2   • Cholecalciferol (VITAMIN D) 50 MCG (2000 UT) Cap Take  by mouth.     • Ascorbic Acid (VITAMIN C) 1000 MG Tab Take  by mouth.     • Omega-3 Fatty Acids (OMEGA-3 FISH OIL) 1200 MG Cap Take  by mouth.     • Dapagliflozin Propanediol (FARXIGA) 10 MG Tab Take 1 tablet by mouth every day. 90 Tab 3   • aspirin EC (ECOTRIN) 325 MG Tablet Delayed Response Take 325 mg by mouth every day.     • sildenafil citrate (VIAGRA) 100 MG tablet Take 100 mg by mouth as needed.     • Coenzyme Q10 (CO Q 10 PO) Take  by mouth.       No current Epic-ordered facility-administered medications on file.       Allergies:  Benicar [olmesartan]    Health Maintenance: Completed    ROS:   Per HPI      Objective:       Exam:  /80 (BP Location: Right arm, Patient Position: Sitting, BP Cuff Size: Adult)   Pulse 76   Temp 36.3 °C (97.3 °F) (Temporal)   Resp 20   Ht 1.88 m (6' 2\")   Wt 104 kg (229 lb 4 oz)   SpO2 97%   BMI 29.43 kg/m²  Body mass index is 29.43 kg/m².    Gen: Alert and oriented, No apparent distress.  Lungs: Normal effort, CTA bilaterally, no wheezes, rhonchi, or rales  CV: Regular rate and rhythm. No murmurs, rubs, or gallops.  Ext: No clubbing, cyanosis, edema.        Assessment & Plan:     66 y.o. male with the following -     Upper respiratory tract infection, unspecified type  This is an acute medical condition.  Patient with a 5-day history of upper respiratory symptoms.  Home COVID-19 testing was negative.  The patient has been COVID-19 vaccination series.  - COVID/SARS CoV-2 PCR; Future  - POCT SARS-COV " Antigen BURT Manual Result  - azithromycin (ZITHROMAX) 250 MG Tab; Take two tablets on the first day, then one tablet daily for 4 days.  Dispense: 6 Tablet; Refill: 0  - methylPREDNISolone (MEDROL DOSEPAK) 4 MG Tablet Therapy Pack; As directed on the packaging label.  Dispense: 21 Tablet; Refill: 0        Return if symptoms worsen or fail to improve.    Please note that this dictation was created using voice recognition software. I have made every reasonable attempt to correct obvious errors, but I expect that there are errors of grammar and possibly content that I did not discover before finalizing the note.

## 2022-03-23 ENCOUNTER — HOSPITAL ENCOUNTER (OUTPATIENT)
Dept: LAB | Facility: MEDICAL CENTER | Age: 67
End: 2022-03-23
Attending: INTERNAL MEDICINE
Payer: COMMERCIAL

## 2022-03-23 ENCOUNTER — HOSPITAL ENCOUNTER (OUTPATIENT)
Facility: MEDICAL CENTER | Age: 67
End: 2022-03-23
Attending: INTERNAL MEDICINE
Payer: COMMERCIAL

## 2022-03-23 ENCOUNTER — OFFICE VISIT (OUTPATIENT)
Dept: MEDICAL GROUP | Facility: PHYSICIAN GROUP | Age: 67
End: 2022-03-23
Payer: COMMERCIAL

## 2022-03-23 VITALS
HEART RATE: 76 BPM | HEIGHT: 74 IN | WEIGHT: 229.25 LBS | TEMPERATURE: 97.3 F | DIASTOLIC BLOOD PRESSURE: 80 MMHG | RESPIRATION RATE: 20 BRPM | OXYGEN SATURATION: 97 % | SYSTOLIC BLOOD PRESSURE: 126 MMHG | BODY MASS INDEX: 29.42 KG/M2

## 2022-03-23 DIAGNOSIS — Z12.5 PROSTATE CANCER SCREENING: ICD-10-CM

## 2022-03-23 DIAGNOSIS — E29.1 HYPOGONADISM IN MALE: ICD-10-CM

## 2022-03-23 DIAGNOSIS — J06.9 UPPER RESPIRATORY TRACT INFECTION, UNSPECIFIED TYPE: ICD-10-CM

## 2022-03-23 DIAGNOSIS — E78.5 HYPERLIPIDEMIA ASSOCIATED WITH TYPE 2 DIABETES MELLITUS (HCC): ICD-10-CM

## 2022-03-23 DIAGNOSIS — E11.69 HYPERLIPIDEMIA ASSOCIATED WITH TYPE 2 DIABETES MELLITUS (HCC): ICD-10-CM

## 2022-03-23 LAB
25(OH)D3 SERPL-MCNC: 44 NG/ML (ref 30–100)
CHOLEST SERPL-MCNC: 114 MG/DL (ref 100–199)
CREAT UR-MCNC: 69.3 MG/DL
EST. AVERAGE GLUCOSE BLD GHB EST-MCNC: 157 MG/DL
EXTERNAL QUALITY CONTROL: NORMAL
FASTING STATUS PATIENT QL REPORTED: NORMAL
HBA1C MFR BLD: 7.1 % (ref 4–5.6)
HDLC SERPL-MCNC: 30 MG/DL
HGB BLD-MCNC: 14.9 G/DL (ref 14–18)
LDLC SERPL CALC-MCNC: 45 MG/DL
MICROALBUMIN UR-MCNC: 22.2 MG/DL
MICROALBUMIN/CREAT UR: 320 MG/G (ref 0–30)
PSA SERPL-MCNC: 2.48 NG/ML (ref 0–4)
SARS-COV+SARS-COV-2 AG RESP QL IA.RAPID: NEGATIVE
T3FREE SERPL-MCNC: 3.29 PG/ML (ref 2–4.4)
T4 FREE SERPL-MCNC: 1.5 NG/DL (ref 0.93–1.7)
TRIGL SERPL-MCNC: 197 MG/DL (ref 0–149)
TSH SERPL DL<=0.005 MIU/L-ACNC: 1.31 UIU/ML (ref 0.38–5.33)

## 2022-03-23 PROCEDURE — 36415 COLL VENOUS BLD VENIPUNCTURE: CPT

## 2022-03-23 PROCEDURE — 82043 UR ALBUMIN QUANTITATIVE: CPT

## 2022-03-23 PROCEDURE — 99214 OFFICE O/P EST MOD 30 MIN: CPT | Performed by: INTERNAL MEDICINE

## 2022-03-23 PROCEDURE — 84443 ASSAY THYROID STIM HORMONE: CPT

## 2022-03-23 PROCEDURE — 80061 LIPID PANEL: CPT

## 2022-03-23 PROCEDURE — 84153 ASSAY OF PSA TOTAL: CPT

## 2022-03-23 PROCEDURE — 82306 VITAMIN D 25 HYDROXY: CPT

## 2022-03-23 PROCEDURE — 83036 HEMOGLOBIN GLYCOSYLATED A1C: CPT

## 2022-03-23 PROCEDURE — 82570 ASSAY OF URINE CREATININE: CPT

## 2022-03-23 PROCEDURE — U0005 INFEC AGEN DETEC AMPLI PROBE: HCPCS

## 2022-03-23 PROCEDURE — 84439 ASSAY OF FREE THYROXINE: CPT

## 2022-03-23 PROCEDURE — 84481 FREE ASSAY (FT-3): CPT

## 2022-03-23 PROCEDURE — 85018 HEMOGLOBIN: CPT

## 2022-03-23 PROCEDURE — 87426 SARSCOV CORONAVIRUS AG IA: CPT | Performed by: INTERNAL MEDICINE

## 2022-03-23 PROCEDURE — U0003 INFECTIOUS AGENT DETECTION BY NUCLEIC ACID (DNA OR RNA); SEVERE ACUTE RESPIRATORY SYNDROME CORONAVIRUS 2 (SARS-COV-2) (CORONAVIRUS DISEASE [COVID-19]), AMPLIFIED PROBE TECHNIQUE, MAKING USE OF HIGH THROUGHPUT TECHNOLOGIES AS DESCRIBED BY CMS-2020-01-R: HCPCS

## 2022-03-23 RX ORDER — AZITHROMYCIN 250 MG/1
TABLET, FILM COATED ORAL
Qty: 6 TABLET | Refills: 0 | Status: SHIPPED | OUTPATIENT
Start: 2022-03-23 | End: 2022-08-04

## 2022-03-23 RX ORDER — METHYLPREDNISOLONE 4 MG/1
TABLET ORAL
Qty: 21 TABLET | Refills: 0 | Status: SHIPPED | OUTPATIENT
Start: 2022-03-23 | End: 2022-08-04

## 2022-03-23 ASSESSMENT — FIBROSIS 4 INDEX: FIB4 SCORE: 1.32664991614215994

## 2022-03-24 DIAGNOSIS — J06.9 UPPER RESPIRATORY TRACT INFECTION, UNSPECIFIED TYPE: ICD-10-CM

## 2022-03-24 LAB
COVID ORDER STATUS COVID19: NORMAL
SARS-COV-2 RNA RESP QL NAA+PROBE: NOTDETECTED
SPECIMEN SOURCE: NORMAL

## 2022-03-29 ENCOUNTER — TELEPHONE (OUTPATIENT)
Dept: MEDICAL GROUP | Facility: PHYSICIAN GROUP | Age: 67
End: 2022-03-29
Payer: COMMERCIAL

## 2022-03-29 NOTE — TELEPHONE ENCOUNTER
1st. Attempt.-Called to see how patient is doing? We received a refill request on antibiotics from the pharmacy. Called to see if patient is improved.

## 2022-04-10 PROCEDURE — RXMED WILLOW AMBULATORY MEDICATION CHARGE: Performed by: INTERNAL MEDICINE

## 2022-04-13 ENCOUNTER — PHARMACY VISIT (OUTPATIENT)
Dept: PHARMACY | Facility: MEDICAL CENTER | Age: 67
End: 2022-04-13
Payer: COMMERCIAL

## 2022-05-03 NOTE — PROGRESS NOTES
"Subjective:     CC:   Chief Complaint   Patient presents with   • Medication Refill   • Diabetes Follow-up         HPI:   Raheem presents today to follow-up on lab results and pregabalin refill.  The patient feels well.  No acute medical complaints.  He is here for his pregabalin refill.    Past Medical History:   Diagnosis Date   • Cardiovascular disease    • Coronary artery disease    • Diabetes (HCC)    • Diabetic neuropathy (HCC)    • Hyperlipidemia    • Hypertension    • Thyroid disease        Social History     Tobacco Use   • Smoking status: Current Every Day Smoker     Packs/day: 0.75     Years: 47.00     Pack years: 35.25     Types: Cigarettes   • Smokeless tobacco: Never Used   Vaping Use   • Vaping Use: Never used   Substance Use Topics   • Alcohol use: Not Currently   • Drug use: Yes     Types: Marijuana     Comment: pt uses THC 5mg tablets everning to sleep        Current Outpatient Medications Ordered in Epic   Medication Sig Dispense Refill   • pregabalin (LYRICA) 75 MG Cap Take 1 Capsule by mouth 3 times a day for 90 days. 90 Capsule 2   • testosterone cypionate (DEPO-TESTOSTERONE) 200 MG/ML Solution injection Inject 0.75 mL intramuscularly every 14 days. 10 mL 2   • SYRINGE-NEEDLE, DISP, 3 ML (B-D 3CC LUER-KRISTAN SYR 93HI1-1/2) 18G X 1-1/2\" 3 ML Misc Use as directed to draw up testosterone every 14 days. 6 Each 3   • SYRINGE-NEEDLE, DISP, 3 ML (B-D 3CC LUER-KRISTAN SYR 25GX1/2\") 25G X 1-1/2\" 3 ML Misc Use as directed to inject testosterone every 14 days. 6 Each 3   • azithromycin (ZITHROMAX) 250 MG Tab Take two tablets on the first day, then one tablet daily for 4 days. 6 Tablet 0   • methylPREDNISolone (MEDROL DOSEPAK) 4 MG Tablet Therapy Pack As directed on the packaging label. 21 Tablet 0   • testosterone cypionate (DEPO-TESTOSTERONE) 200 MG/ML Solution injection Inject 150 mg (0.75 ml ) intramuscularly every 14 days for 28 days 2 mL 5   • OZEMPIC, 1 MG/DOSE, 4 MG/3ML Solution Pen-injector 1 mg.     • " "losartan (COZAAR) 50 MG Tab TAKE ONE TABLET BY MOUTH DAILY 90 tablet 1   • metFORMIN ER (GLUCOPHAGE XR) 500 MG TABLET SR 24 HR TAKE TWO TABLETS BY MOUTH TWICE A  tablet 1   • OZEMPIC, 1 MG/DOSE, 2 MG/1.5ML Solution Pen-injector INJECT 1 MG UNDER THE SKIN EVERY SEVEN DAYS. 3 mL 1   • pioglitazone (ACTOS) 30 MG Tab TAKE ONE TABLET BY MOUTH DAILY 90 tablet 0   • atenolol (TENORMIN) 50 MG Tab TAKE 1 TAB BY MOUTH EVERY DAY. 90 Tab 2   • atorvastatin (LIPITOR) 40 MG Tab TAKE ONE TABLET BY MOUTH DAILY 90 Tab 2   • levothyroxine (SYNTHROID) 75 MCG Tab Take 1 Tab by mouth Every morning on an empty stomach. 90 Tab 2   • Cholecalciferol (VITAMIN D) 50 MCG (2000 UT) Cap Take  by mouth.     • Ascorbic Acid (VITAMIN C) 1000 MG Tab Take  by mouth.     • Omega-3 Fatty Acids (OMEGA-3 FISH OIL) 1200 MG Cap Take  by mouth.     • Dapagliflozin Propanediol (FARXIGA) 10 MG Tab Take 1 tablet by mouth every day. 90 Tab 3   • aspirin EC (ECOTRIN) 325 MG Tablet Delayed Response Take 325 mg by mouth every day.     • sildenafil citrate (VIAGRA) 100 MG tablet Take 100 mg by mouth as needed.     • Coenzyme Q10 (CO Q 10 PO) Take  by mouth.       No current Epic-ordered facility-administered medications on file.       Allergies:  Benicar [olmesartan]    Health Maintenance: Completed    ROS:   Denies any recent fevers or chills. No nausea or vomiting. No chest pains or shortness of breath.      Objective:       Exam:  /80 (BP Location: Left arm, Patient Position: Sitting, BP Cuff Size: Adult)   Pulse 78   Temp 36.6 °C (97.9 °F) (Temporal)   Resp 20   Ht 1.88 m (6' 2\")   Wt 103 kg (227 lb)   SpO2 98%   BMI 29.15 kg/m²  Body mass index is 29.15 kg/m².    Gen: Alert and oriented, No apparent distress.  Lungs: Normal effort, CTA bilaterally, no wheezes, rhonchi, or rales  CV: Regular rate and rhythm. No murmurs, rubs, or gallops.  Ext: No clubbing, cyanosis, edema.    Monofilament testing with a 10 gram force: sensation intact: " decreased bilaterally  Visual Inspection: Feet without maceration, ulcers, fissures.  Pedal pulses: intact bilaterally      Assessment & Plan:     66 y.o. male with the following -     Hyperlipidemia associated with type 2 diabetes mellitus (HCC)  With regard to the patient's type 2 diabetes, chronic and stable.  Hemoglobin A1c at goal less than 7.  The patient is followed by endocrinology, Dr. Mas.  He is on Farxiga 10 mg daily, metformin 1000 mg twice daily, pioglitazone 30 mg daily, Ozempic 1 mg every 7 days.    Hemoglobin A1c from 3/23/2022 was 7.1, stable from 7.  Urine microalbumin creatinine ratio was elevated at 320.  -Continue metformin 1000 mg twice daily  -Continue pioglitazone 30 mg daily  -Continue Ozempic 1 mg every 7 days  -Continue Farxiga 10 mg daily  - Diabetic Monofilament LE Exam     With regard to the patient's hyperlipidemia, it is well controlled.  LDL at goal of less than 70.  The patient is on atorvastatin 40 mg nightly.  The patient denies statin associated myalgias.  Lipid panel from 3/23/2022 showed a total cholesterol 114, LDL 45, HDL 30, triglyceride 27.  -Continue atorvastatin 40 mg nightly   - Lipid Profile; Future     Diabetic peripheral neuropathy (HCC)  Chronic medical condition.  Stable. The patient reports bilateral foot numbness and tingling.  The patient is on pregabalin 75 mg 3 times daily.  Review of the patient's  shows that he was last prescribed pregabalin 75 mg, dispo #60, on 3/31/2022. Obtained and reviewed patient utilization report from [facility] database on 5/4/2022 7:29 AM  prior to writing prescription for controlled substance II, III or IV per [place] bill . Based on assessment of the report, the prescription is medically necessary.   -Controlled substance treatment agreement and signed and scanned into the patient's chart on 5/4/2022   -Continue pregabalin 75 mg 3 times daily  - Controlled Substance Treatment Agreement  - pregabalin  (LYRICA) 75 MG Cap; Take 1 Capsule by mouth 3 times a day for 90 days.  Dispense: 90 Capsule; Refill: 2  - Pain Management Screen; Future  - Diabetic Monofilament LE Exam     Coronary artery disease involving native coronary artery of native heart without angina pectoris  Chronic medical condition.    Stable.  The patient had four-vessel CABG in 2007.  Reports comprehensive cardiac work-up August, 2020, including nuclear stress testing, echocardiogram and EKG performed in [place], [place].  The patient is on atorvastatin 40 mg nightly.  He denies statin associated myalgias.  LDL at goal of less than 70.  Lipid panel from 3/23/2022 showed a total cholesterol 114, LDL 45, HDL 30, triglycerides 97.  -Continue atorvastatin 40 mg nightly for lipid management  -Continue atenolol 50 mg daily and losartan 50 mg daily for blood pressure management     Primary hypertension  Chronic medical condition.  Blood pressure at goal of less than. The patient is on atenolol 50 mg daily, losartan 50 mg daily.  He denies new headaches, chest pain, shortness of breath, or lower extremity edema.  His blood pressure is normal today's visit.  -Continue atenolol 50 mg daily  -Continue losartan 50 mg daily     Acquired hypothyroidism  Chronic medical condition.  Well-controlled.  The patient is managed by endocrinology, Dr. Mas.  He is on levothyroxine 75 mcg daily.  TSH from 3/23/2022 was normal at 1.31 with a normal free T4 1.5.  The patient is clinically euthyroid.  -Continue levothyroxine 75 mcg daily     Hypogonadism in male  Chronic medical condition.  Well-controlled.  The patient is managed by endocrinology, Dr. Mas. The patient is taking 150 mg every 2 weeks. Testosterone level from 12/20/2021 was elevated at 877.  PSA from 3/23/2022 was normal at 2.4.  -Continue testosterone 150 mg every 2 weeks, managed by endocrinology     Elevated hemoglobin (HCC)  Chronic medical condition.  Recurrent.  Secondary to TRT.  Hemoglobin  from 3/22/2022 was normal at 14.9.  The patient undergoes phlebotomy every 3 months.   -Continue phlebotomy every 3 months, managed by endocrinology    Return in about 3 months (around 8/4/2022) for Controlled Substance.    Please note that this dictation was created using voice recognition software. I have made every reasonable attempt to correct obvious errors, but I expect that there are errors of grammar and possibly content that I did not discover before finalizing the note.

## 2022-05-04 ENCOUNTER — OFFICE VISIT (OUTPATIENT)
Dept: MEDICAL GROUP | Facility: PHYSICIAN GROUP | Age: 67
End: 2022-05-04
Payer: COMMERCIAL

## 2022-05-04 VITALS
OXYGEN SATURATION: 98 % | BODY MASS INDEX: 29.13 KG/M2 | HEIGHT: 74 IN | DIASTOLIC BLOOD PRESSURE: 80 MMHG | SYSTOLIC BLOOD PRESSURE: 132 MMHG | RESPIRATION RATE: 20 BRPM | TEMPERATURE: 97.9 F | HEART RATE: 78 BPM | WEIGHT: 227 LBS

## 2022-05-04 DIAGNOSIS — E29.1 HYPOGONADISM IN MALE: ICD-10-CM

## 2022-05-04 DIAGNOSIS — E78.5 HYPERLIPIDEMIA ASSOCIATED WITH TYPE 2 DIABETES MELLITUS (HCC): ICD-10-CM

## 2022-05-04 DIAGNOSIS — E11.69 HYPERLIPIDEMIA ASSOCIATED WITH TYPE 2 DIABETES MELLITUS (HCC): ICD-10-CM

## 2022-05-04 DIAGNOSIS — E11.42 DIABETIC PERIPHERAL NEUROPATHY (HCC): ICD-10-CM

## 2022-05-04 DIAGNOSIS — E03.9 ACQUIRED HYPOTHYROIDISM: ICD-10-CM

## 2022-05-04 DIAGNOSIS — I10 PRIMARY HYPERTENSION: ICD-10-CM

## 2022-05-04 DIAGNOSIS — I25.10 CORONARY ARTERY DISEASE INVOLVING NATIVE CORONARY ARTERY OF NATIVE HEART WITHOUT ANGINA PECTORIS: ICD-10-CM

## 2022-05-04 DIAGNOSIS — D58.2 ELEVATED HEMOGLOBIN (HCC): ICD-10-CM

## 2022-05-04 PROCEDURE — 99214 OFFICE O/P EST MOD 30 MIN: CPT | Performed by: INTERNAL MEDICINE

## 2022-05-04 RX ORDER — PREGABALIN 75 MG/1
75 CAPSULE ORAL 3 TIMES DAILY
Qty: 90 CAPSULE | Refills: 2 | Status: SHIPPED | OUTPATIENT
Start: 2022-05-04 | End: 2022-08-02

## 2022-05-04 ASSESSMENT — FIBROSIS 4 INDEX: FIB4 SCORE: 1.32664991614215994

## 2022-05-11 ENCOUNTER — PHARMACY VISIT (OUTPATIENT)
Dept: PHARMACY | Facility: MEDICAL CENTER | Age: 67
End: 2022-05-11
Payer: COMMERCIAL

## 2022-05-11 PROCEDURE — RXMED WILLOW AMBULATORY MEDICATION CHARGE: Performed by: INTERNAL MEDICINE

## 2022-06-08 ENCOUNTER — PHARMACY VISIT (OUTPATIENT)
Dept: PHARMACY | Facility: MEDICAL CENTER | Age: 67
End: 2022-06-08
Payer: COMMERCIAL

## 2022-06-08 PROCEDURE — RXMED WILLOW AMBULATORY MEDICATION CHARGE: Performed by: INTERNAL MEDICINE

## 2022-06-15 ENCOUNTER — HOSPITAL ENCOUNTER (OUTPATIENT)
Dept: LAB | Facility: MEDICAL CENTER | Age: 67
End: 2022-06-15
Attending: INTERNAL MEDICINE
Payer: COMMERCIAL

## 2022-06-15 DIAGNOSIS — E11.42 DIABETIC PERIPHERAL NEUROPATHY (HCC): ICD-10-CM

## 2022-06-15 LAB
CREAT UR-MCNC: 63.31 MG/DL
HCT VFR BLD AUTO: 49.5 % (ref 42–52)
HGB BLD-MCNC: 14.6 G/DL (ref 14–18)
MICROALBUMIN UR-MCNC: 29.7 MG/DL
MICROALBUMIN/CREAT UR: 469 MG/G (ref 0–30)

## 2022-06-15 PROCEDURE — 85018 HEMOGLOBIN: CPT

## 2022-06-15 PROCEDURE — 85014 HEMATOCRIT: CPT

## 2022-06-15 PROCEDURE — 82570 ASSAY OF URINE CREATININE: CPT

## 2022-06-15 PROCEDURE — 82043 UR ALBUMIN QUANTITATIVE: CPT

## 2022-06-15 PROCEDURE — 36415 COLL VENOUS BLD VENIPUNCTURE: CPT

## 2022-06-15 PROCEDURE — G0481 DRUG TEST DEF 8-14 CLASSES: HCPCS

## 2022-06-18 LAB
1OH-MIDAZOLAM UR QL SCN: NOT DETECTED
6MAM UR QL: NOT DETECTED
7AMINOCLONAZEPAM UR QL: NOT DETECTED
A-OH ALPRAZ UR QL: NOT DETECTED
ALPRAZ UR QL: NOT DETECTED
AMPHET UR QL SCN: NOT DETECTED
ANNOTATION COMMENT IMP: NORMAL
ANNOTATION COMMENT IMP: NORMAL
BARBITURATES UR QL: NOT DETECTED
BUPRENORPHINE UR QL: NOT DETECTED
BZE UR QL: NOT DETECTED
CARBOXYTHC UR QL: PRESENT
CARISOPRODOL UR QL: NOT DETECTED
CLONAZEPAM UR QL: NOT DETECTED
CODEINE UR QL: NOT DETECTED
DIAZEPAM UR QL: NOT DETECTED
ETHYL GLUCURONIDE UR QL: NOT DETECTED
FENTANYL UR QL: NOT DETECTED
GABAPENTIN UR QL: NOT DETECTED
HYDROCODONE UR QL: NOT DETECTED
HYDROMORPHONE UR QL: NOT DETECTED
LORAZEPAM UR QL: NOT DETECTED
MDA UR QL: NOT DETECTED
MDEA UR QL: NOT DETECTED
MDMA UR QL: NOT DETECTED
MEPERIDINE UR QL: NOT DETECTED
METHADONE UR QL: NOT DETECTED
METHAMPHET UR QL: NOT DETECTED
MIDAZOLAM UR QL SCN: NOT DETECTED
MORPHINE UR QL: NOT DETECTED
NALOXONE UR QL SCN: NOT DETECTED
NORBUPRENORPHINE UR QL CFM: NOT DETECTED
NORDIAZEPAM UR QL: NOT DETECTED
NORFENTANYL UR QL: NOT DETECTED
NORHYDROCODONE UR QL CFM: NOT DETECTED
NOROXYCODONE UR QL CFM: NOT DETECTED
NOROXYMORPH CO100 Q0458: NOT DETECTED
OXAZEPAM UR QL: NOT DETECTED
OXYCODONE UR QL: NOT DETECTED
OXYMORPHONE UR QL: NOT DETECTED
PATHOLOGY STUDY: NORMAL
PCP UR QL: NOT DETECTED
PHENTERMINE UR QL: NOT DETECTED
PPAA UR QL: NOT DETECTED
PREGABALIN UR QL SCN: PRESENT
SERVICE CMNT-IMP: NORMAL
TAPENADOL OSULF CO200 Q0473: NOT DETECTED
TAPENTADOL UR QL SCN: NOT DETECTED
TEMAZEPAM UR QL: NOT DETECTED
TRAMADOL UR QL: NOT DETECTED
ZOLPIDEM PHENYL-4-CARB UR QL SCN: NOT DETECTED
ZOLPIDEM UR QL: NOT DETECTED

## 2022-07-05 PROCEDURE — RXMED WILLOW AMBULATORY MEDICATION CHARGE: Performed by: INTERNAL MEDICINE

## 2022-07-06 ENCOUNTER — PHARMACY VISIT (OUTPATIENT)
Dept: PHARMACY | Facility: MEDICAL CENTER | Age: 67
End: 2022-07-06
Payer: COMMERCIAL

## 2022-08-02 PROCEDURE — RXMED WILLOW AMBULATORY MEDICATION CHARGE: Performed by: INTERNAL MEDICINE

## 2022-08-03 ENCOUNTER — PHARMACY VISIT (OUTPATIENT)
Dept: PHARMACY | Facility: MEDICAL CENTER | Age: 67
End: 2022-08-03
Payer: COMMERCIAL

## 2022-08-03 NOTE — PROGRESS NOTES
"Subjective:     CC:   Chief Complaint   Patient presents with   • Follow-Up     Medication, Pregabalin          HPI:   Raheem Ospina is a 67-year-old male who presents for follow-up visit. The patient reports progressive lower back pain and peripheral neuropathy.  He was recently made aware of some new implant that is hopefully helpful with diabetic peripheral neuropathy.  He will forward the information to me when he receives it.    Past Medical History:   Diagnosis Date   • Cardiovascular disease    • Coronary artery disease    • Diabetes (HCC)    • Diabetic neuropathy (HCC)    • Hyperlipidemia    • Hypertension    • Thyroid disease        Social History     Tobacco Use   • Smoking status: Current Every Day Smoker     Packs/day: 0.75     Years: 47.00     Pack years: 35.25     Types: Cigarettes   • Smokeless tobacco: Never Used   Vaping Use   • Vaping Use: Never used   Substance Use Topics   • Alcohol use: Not Currently   • Drug use: Yes     Types: Marijuana     Comment: pt uses THC 5mg tablets everning to sleep        Current Outpatient Medications Ordered in Epic   Medication Sig Dispense Refill   • testosterone cypionate (DEPO-TESTOSTERONE) 200 MG/ML Solution injection Inject 0.75 mL intramuscularly every 14 days. 10 mL 2   • SYRINGE-NEEDLE, DISP, 3 ML (B-D 3CC LUER-KRISTAN SYR 76LX5-1/2) 18G X 1-1/2\" 3 ML Misc Use as directed to draw up testosterone every 14 days. 6 Each 3   • SYRINGE-NEEDLE, DISP, 3 ML (B-D 3CC LUER-KRISTAN SYR 25GX1/2\") 25G X 1-1/2\" 3 ML Misc Use as directed to inject testosterone every 14 days. 6 Each 3   • testosterone cypionate (DEPO-TESTOSTERONE) 200 MG/ML Solution injection Inject 150 mg (0.75 ml ) intramuscularly every 14 days for 28 days 2 mL 5   • OZEMPIC, 1 MG/DOSE, 4 MG/3ML Solution Pen-injector 1 mg.     • losartan (COZAAR) 50 MG Tab TAKE ONE TABLET BY MOUTH DAILY 90 tablet 1   • metFORMIN ER (GLUCOPHAGE XR) 500 MG TABLET SR 24 HR TAKE TWO TABLETS BY MOUTH TWICE A  tablet 1 " "  • OZEMPIC, 1 MG/DOSE, 2 MG/1.5ML Solution Pen-injector INJECT 1 MG UNDER THE SKIN EVERY SEVEN DAYS. 3 mL 1   • pioglitazone (ACTOS) 30 MG Tab TAKE ONE TABLET BY MOUTH DAILY 90 tablet 0   • atenolol (TENORMIN) 50 MG Tab TAKE 1 TAB BY MOUTH EVERY DAY. 90 Tab 2   • atorvastatin (LIPITOR) 40 MG Tab TAKE ONE TABLET BY MOUTH DAILY 90 Tab 2   • levothyroxine (SYNTHROID) 75 MCG Tab Take 1 Tab by mouth Every morning on an empty stomach. 90 Tab 2   • Cholecalciferol (VITAMIN D) 50 MCG (2000 UT) Cap Take  by mouth.     • Ascorbic Acid (VITAMIN C) 1000 MG Tab Take  by mouth.     • Omega-3 Fatty Acids (OMEGA-3 FISH OIL) 1200 MG Cap Take  by mouth.     • Dapagliflozin Propanediol (FARXIGA) 10 MG Tab Take 1 tablet by mouth every day. 90 Tab 3   • aspirin EC (ECOTRIN) 325 MG Tablet Delayed Response Take 325 mg by mouth every day.     • sildenafil citrate (VIAGRA) 100 MG tablet Take 100 mg by mouth as needed.     • Coenzyme Q10 (CO Q 10 PO) Take  by mouth.     • azithromycin (ZITHROMAX) 250 MG Tab Take two tablets on the first day, then one tablet daily for 4 days. (Patient not taking: Reported on 8/4/2022) 6 Tablet 0   • methylPREDNISolone (MEDROL DOSEPAK) 4 MG Tablet Therapy Pack As directed on the packaging label. (Patient not taking: Reported on 8/4/2022) 21 Tablet 0     No current Epic-ordered facility-administered medications on file.       Allergies:  Benicar [olmesartan]    Health Maintenance: Completed    ROS:   Denies any recent fevers or chills. No nausea or vomiting. No chest pains or shortness of breath.      Objective:     Exam:  /70   Pulse 79   Temp 36.2 °C (97.2 °F) (Temporal)   Resp 18   Ht 1.88 m (6' 2\")   Wt 103 kg (227 lb)   SpO2 97%   BMI 29.15 kg/m²  Body mass index is 29.15 kg/m².    Gen: Alert and oriented, No apparent distress.  Lungs: Normal effort, CTA bilaterally, no wheezes, rhonchi, or rales  CV: Regular rate and rhythm. No murmurs, rubs, or gallops.  Ext: No clubbing, cyanosis, " edema.        Assessment & Plan:     67 y.o. male with the following -     Hyperlipidemia associated with type 2 diabetes mellitus (HCC)  With regard to the patient's type 2 diabetes, chronic  medical condition.    · The patient is managed by endocrinology, Dr. Mas.    · Hemoglobin A1c from 3/23/2022 was 7.1, stable from 7.    · Urine microalbumin creatinine ratio was elevated at 320.  · Continue current regimen of metformin 1000 mg twice daily, pioglitazone 30 mg daily, Farxiga 10 mg daily, and Ozempic 1 mg every 7 days.     With regard to the patient's hyperlipidemia, chronic medical condition.    · LDL at goal of less than 70.    · Lipid panel from 3/23/2022 showed a total cholesterol 114, LDL 45, HDL 30, triglyceride 27.  · Continue current regimen of atorvastatin 40 mg nightly.     Diabetic peripheral neuropathy (HCC)  Chronic medical condition.  Progressive.  · The patient reports bilateral foot numbness and tingling.  He reports pain is progressive.  He was recently made aware of some new implant that is hopefully helpful with the diabetic peripheral neuropathy.  He will forward the information to me when he receives it.   · The patient is currently taking pregabalin from 150 mg in the mornings and 75 mg at night.  · Review of the patient's  shows that he was last prescribed pregabalin 75 mg, dispo #90, on  7/5/2022.   · Obtained and reviewed patient utilization report from [facility] pharmacy database on 8/4/2022 5:26 AM  prior to writing prescription for controlled substance II, III or IV per [place] bill . Based on assessment of the report, the prescription is medically necessary.   · Controlled substance treatment agreement and signed and scanned into the patient's chart on 5/4/2022  · Increase pregabalin to 150 mg twice daily.   - pregabalin (LYRICA) 150 MG Cap; Take 1 Capsule by mouth 2 times a day for 30 days.  Dispense: 60 Capsule; Refill: 2    Coronary artery disease involving native  coronary artery of native heart without angina pectoris  Chronic medical condition.      · The patient had four-vessel CABG in 2007.    · LDL at goal of less than 70.    · Lipid panel from 3/23/2022 showed a total cholesterol 114, LDL 45, HDL 30, triglycerides 97.  · Continue current regimen of atorvastatin 40 mg nightly.     Primary hypertension  Chronic medical condition.    · Blood pressure at today's visit is 112/70.  · He denies new headaches, chest pain, shortness of breath, or lower extremity edema.    · Continue current regimen of atenolol 50 mg daily and losartan 50 mg daily.     Acquired hypothyroidism  Chronic medical condition.    · The patient is managed by endocrinology, Dr. Mas.    · TSH from 3/23/2022 was normal at 1.31 with a normal free T4 1.5.    · The patient is clinically euthyroid.  · Continue current regimen of levothyroxine 75 mcg daily.     Hypogonadism in male  Elevated hemoglobin (HCC)  Chronic medical condition.    · The patient is managed by endocrinology, Dr. Mas.   · Testosterone level from 12/20/2021 was elevated at 877.    · PSA from 3/23/2022 was normal at 2.4.  · Patient also has elevated hemoglobin secondary to chronic TRT.  He undergoes phlebotomy every 3 months, managed by endocrinology. Hemoglobin and hematocrit from 6/15/2022 are 14.6 and 49.5.    · Continue current regimen of testosterone 150 mg every 2 weeks, managed by endocrinology.       Return in about 3 months (around 11/4/2022) for Controlled Substance.    Please note that this dictation was created using voice recognition software. I have made every reasonable attempt to correct obvious errors, but I expect that there are errors of grammar and possibly content that I did not discover before finalizing the note.

## 2022-08-04 ENCOUNTER — OFFICE VISIT (OUTPATIENT)
Dept: MEDICAL GROUP | Facility: PHYSICIAN GROUP | Age: 67
End: 2022-08-04
Payer: COMMERCIAL

## 2022-08-04 VITALS
WEIGHT: 227 LBS | BODY MASS INDEX: 29.13 KG/M2 | SYSTOLIC BLOOD PRESSURE: 112 MMHG | DIASTOLIC BLOOD PRESSURE: 70 MMHG | RESPIRATION RATE: 18 BRPM | HEART RATE: 79 BPM | OXYGEN SATURATION: 97 % | HEIGHT: 74 IN | TEMPERATURE: 97.2 F

## 2022-08-04 DIAGNOSIS — E03.9 ACQUIRED HYPOTHYROIDISM: ICD-10-CM

## 2022-08-04 DIAGNOSIS — E11.42 DIABETIC PERIPHERAL NEUROPATHY (HCC): ICD-10-CM

## 2022-08-04 DIAGNOSIS — E11.69 HYPERLIPIDEMIA ASSOCIATED WITH TYPE 2 DIABETES MELLITUS (HCC): ICD-10-CM

## 2022-08-04 DIAGNOSIS — E78.5 HYPERLIPIDEMIA ASSOCIATED WITH TYPE 2 DIABETES MELLITUS (HCC): ICD-10-CM

## 2022-08-04 DIAGNOSIS — I25.10 CORONARY ARTERY DISEASE INVOLVING NATIVE CORONARY ARTERY OF NATIVE HEART WITHOUT ANGINA PECTORIS: ICD-10-CM

## 2022-08-04 DIAGNOSIS — E29.1 HYPOGONADISM IN MALE: ICD-10-CM

## 2022-08-04 DIAGNOSIS — D58.2 ELEVATED HEMOGLOBIN (HCC): ICD-10-CM

## 2022-08-04 DIAGNOSIS — I10 PRIMARY HYPERTENSION: ICD-10-CM

## 2022-08-04 PROCEDURE — 99214 OFFICE O/P EST MOD 30 MIN: CPT | Performed by: INTERNAL MEDICINE

## 2022-08-04 RX ORDER — PREGABALIN 75 MG/1
75 CAPSULE ORAL 3 TIMES DAILY
Qty: 90 CAPSULE | Refills: 2 | Status: CANCELLED | OUTPATIENT
Start: 2022-08-04 | End: 2022-09-03

## 2022-08-04 RX ORDER — PREGABALIN 150 MG/1
150 CAPSULE ORAL 2 TIMES DAILY
Qty: 60 CAPSULE | Refills: 2 | Status: SHIPPED | OUTPATIENT
Start: 2022-08-04 | End: 2022-10-04

## 2022-08-04 ASSESSMENT — FIBROSIS 4 INDEX: FIB4 SCORE: 1.35

## 2022-08-29 PROCEDURE — RXMED WILLOW AMBULATORY MEDICATION CHARGE: Performed by: INTERNAL MEDICINE

## 2022-08-31 ENCOUNTER — PHARMACY VISIT (OUTPATIENT)
Dept: PHARMACY | Facility: MEDICAL CENTER | Age: 67
End: 2022-08-31
Payer: COMMERCIAL

## 2022-09-28 ENCOUNTER — PHARMACY VISIT (OUTPATIENT)
Dept: PHARMACY | Facility: MEDICAL CENTER | Age: 67
End: 2022-09-28
Payer: COMMERCIAL

## 2022-09-28 PROCEDURE — RXMED WILLOW AMBULATORY MEDICATION CHARGE: Performed by: INTERNAL MEDICINE

## 2022-09-28 RX ORDER — TESTOSTERONE CYPIONATE 200 MG/ML
150 INJECTION, SOLUTION INTRAMUSCULAR
Qty: 2 ML | Refills: 5 | OUTPATIENT
Start: 2022-09-28 | End: 2023-12-31

## 2022-10-03 DIAGNOSIS — E11.42 DIABETIC PERIPHERAL NEUROPATHY (HCC): ICD-10-CM

## 2022-10-04 RX ORDER — PREGABALIN 150 MG/1
CAPSULE ORAL
Qty: 60 CAPSULE | Refills: 1 | Status: SHIPPED | OUTPATIENT
Start: 2022-10-04 | End: 2022-11-03

## 2022-10-26 ENCOUNTER — PHARMACY VISIT (OUTPATIENT)
Dept: PHARMACY | Facility: MEDICAL CENTER | Age: 67
End: 2022-10-26
Payer: COMMERCIAL

## 2022-10-26 PROCEDURE — RXMED WILLOW AMBULATORY MEDICATION CHARGE: Performed by: INTERNAL MEDICINE

## 2022-10-31 NOTE — PROGRESS NOTES
"Subjective:     CC:   Chief Complaint   Patient presents with    Follow-Up     3 months follow up    Medication Follow-up         HPI:   Raheem presents today for a follow-up visit for medication refill.  He feels well.  No acute medical complaints.  He reports the higher dose of the pregabalin has been more effective.  His endocrinologist increased his Ozempic from 1 mg weekly to 2 mg weekly.      Past Medical History:   Diagnosis Date    Cardiovascular disease     Coronary artery disease     Diabetes (HCC)     Diabetic neuropathy (HCC)     Hyperlipidemia     Hypertension     Thyroid disease        Social History     Tobacco Use    Smoking status: Every Day     Packs/day: 0.75     Years: 47.00     Pack years: 35.25     Types: Cigarettes    Smokeless tobacco: Never   Vaping Use    Vaping Use: Never used   Substance Use Topics    Alcohol use: Not Currently    Drug use: Yes     Types: Marijuana     Comment: pt uses THC 5mg tablets everning to sleep        Current Outpatient Medications Ordered in Epic   Medication Sig Dispense Refill    OZEMPIC, 2 MG/DOSE, 8 MG/3ML Solution Pen-injector       [START ON 12/1/2022] pregabalin (LYRICA) 150 MG Cap Take 1 Capsule by mouth 2 times a day for 30 days. Indications: Neuropathic Pain 60 Capsule 2    SYRINGE-NEEDLE, DISP, 3 ML (B-D 3CC LUER-KRISTAN SYR 20BN7-6/2) 18G X 1-1/2\" 3 ML Misc Use as directed to draw up testosterone every 14 days. 6 Each 3    SYRINGE-NEEDLE, DISP, 3 ML (B-D 3CC LUER-KRISTAN SYR 25GX1/2\") 25G X 1-1/2\" 3 ML Misc Use as directed to inject testosterone every 14 days. 6 Each 3    testosterone cypionate (DEPO-TESTOSTERONE) 200 MG/ML Solution injection Inject 150 mg (0.75 ml ) intramuscularly every 14 days for 28 days 2 mL 5    losartan (COZAAR) 50 MG Tab TAKE ONE TABLET BY MOUTH DAILY 90 tablet 1    metFORMIN ER (GLUCOPHAGE XR) 500 MG TABLET SR 24 HR TAKE TWO TABLETS BY MOUTH TWICE A  tablet 1    pioglitazone (ACTOS) 30 MG Tab TAKE ONE TABLET BY MOUTH DAILY 90 " "tablet 0    atenolol (TENORMIN) 50 MG Tab TAKE 1 TAB BY MOUTH EVERY DAY. 90 Tab 2    atorvastatin (LIPITOR) 40 MG Tab TAKE ONE TABLET BY MOUTH DAILY 90 Tab 2    levothyroxine (SYNTHROID) 75 MCG Tab Take 1 Tab by mouth Every morning on an empty stomach. 90 Tab 2    Cholecalciferol (VITAMIN D) 50 MCG (2000 UT) Cap Take  by mouth.      Ascorbic Acid (VITAMIN C) 1000 MG Tab Take  by mouth.      Omega-3 Fatty Acids (OMEGA-3 FISH OIL) 1200 MG Cap Take  by mouth.      Dapagliflozin Propanediol (FARXIGA) 10 MG Tab Take 1 tablet by mouth every day. 90 Tab 3    aspirin EC (ECOTRIN) 325 MG Tablet Delayed Response Take 325 mg by mouth every day.      Coenzyme Q10 (CO Q 10 PO) Take  by mouth.      sildenafil citrate (VIAGRA) 100 MG tablet Take 100 mg by mouth as needed. (Patient not taking: Reported on 11/4/2022)       No current [facility]-ordered facility-administered medications on file.       Allergies:  Benicar [olmesartan]    Health Maintenance: Completed    Review of Systems:  No fevers or chills. No cough, chest pain, or shortness of breath.       Objective:       Exam:  /70 (BP Location: Right arm, Patient Position: Sitting, BP Cuff Size: Adult)   Pulse 86   Temp 36.4 °C (97.6 °F) (Temporal)   Resp 18   Ht 1.88 m (6' 2\")   Wt 105 kg (231 lb 8 oz)   SpO2 97%   BMI 29.72 kg/m²  Body mass index is 29.72 kg/m².    Gen: Alert and oriented, No apparent distress.  Lungs: Normal effort, CTA bilaterally, no wheezes, rhonchi, or rales  CV: Regular rate and rhythm. No murmurs, rubs, or gallops.  Ext: No clubbing, cyanosis, edema.        Assessment & Plan:     67 y.o. male with the following -     Hyperlipidemia associated with type 2 diabetes mellitus (HCC)  With regard to the patient's type 2 diabetes, chronic medical condition.  The patient is managed by endocrinology, Dr. Mas.  Hemoglobin A1c from 3/23/2022 was 7.1, stable from 7.  Urine microalbumin creatinine ratio on 6/15/2022 was elevated at 469.  -Continue " current regimen of metformin 1000 mg twice daily, pioglitazone 30 mg daily, Farxiga 10 mg daily, and Ozempic 2 mg weekly.     With regard to the patient's hyperlipidemia, chronic medical condition.  LDL at goal of less than 70.  Lipid panel from 3/23/2022 showed a total cholesterol 114, LDL 45, HDL 30, triglyceride 27.  -Continue current regimen of atorvastatin 40 mg nightly.     Diabetic peripheral neuropathy (HCC)  Chronic medical condition.  Stable. The patient reports bilateral foot numbness and tingling.  At the patient's last visit we increased his pregabalin to 150 mg twice daily.  Review of the patient's  shows that he was last prescribed pregabalin 150 mg, dispo #60, on 11/1/2022. Obtained and reviewed patient utilization report from [facility] pharmacy database on 11/4/2022 5:41 AM  prior to writing prescription for controlled substance II, III or IV per [place] bill . Based on assessment of the report, the prescription is medically necessary.   -Controlled substance treatment agreement and signed and scanned into the patient's chart on 5/4/2022  -Urine drug screen completed on 6/15/2022  -Continue pregabalin 150 mg twice weekly   - pregabalin (LYRICA) 150 MG Cap; Take 1 Capsule by mouth 2 times a day for 30 days.  Dispense: 60 Capsule; Refill: 2     Primary hypertension  Chronic medical condition.  Ongoing and well controlled.  Blood pressure at today's visit is 124/70. He denies new headaches, chest pain, shortness of breath, or lower extremity edema.    - Continue current regimen of atenolol 50 mg daily and losartan 50 mg daily.     Acquired hypothyroidism  Chronic medical condition.  Ongoing and well controlled.  The patient is managed by endocrinology, Dr. Mas.  TSH from 3/23/2022 was normal at 1.31 with a normal free T4 1.5.  The patient is clinically euthyroid.  -Continue current regimen of levothyroxine 75 mcg daily.     Hypogonadism in male  Elevated hemoglobin (HCC)  Chronic medical  condition.  Ongoing and well controlled.  The patient is managed by endocrinology, Dr. Mas. Testosterone level from 12/20/2021 was elevated at 877.  PSA from 3/23/2022 was normal at 2.4. The patient also has elevated hemoglobin secondary to chronic TRT.  He undergoes phlebotomy every 3 months, managed by endocrinology. Hemoglobin and hematocrit from 6/15/2022 are 14.6 and 49.5.    -Continue current regimen of testosterone 150 mg every 2 weeks, managed by endocrinology.    Coronary artery disease involving native coronary artery of native heart without angina pectoris  Mixed hyperlipidemia  Chronic medical condition.  Ongoing and well controlled.  The patient had four-vessel CABG in 2007.  LDL at goal of less than 70.  Lipid panel from 3/23/2022 showed a total cholesterol 114, LDL 45, HDL 30, triglycerides 97.  -Continue current regimen of atorvastatin 40 mg nightly and  mg daily       Return in about 3 months (around 2/4/2023) for Controlled Substance.    Please note that this dictation was created using voice recognition software. I have made every reasonable attempt to correct obvious errors, but I expect that there are errors of grammar and possibly content that I did not discover before finalizing the note.

## 2022-11-04 ENCOUNTER — OFFICE VISIT (OUTPATIENT)
Dept: MEDICAL GROUP | Facility: PHYSICIAN GROUP | Age: 67
End: 2022-11-04
Payer: COMMERCIAL

## 2022-11-04 VITALS
WEIGHT: 231.5 LBS | OXYGEN SATURATION: 97 % | RESPIRATION RATE: 18 BRPM | TEMPERATURE: 97.6 F | DIASTOLIC BLOOD PRESSURE: 70 MMHG | HEART RATE: 86 BPM | HEIGHT: 74 IN | BODY MASS INDEX: 29.71 KG/M2 | SYSTOLIC BLOOD PRESSURE: 124 MMHG

## 2022-11-04 DIAGNOSIS — E78.5 HYPERLIPIDEMIA ASSOCIATED WITH TYPE 2 DIABETES MELLITUS (HCC): ICD-10-CM

## 2022-11-04 DIAGNOSIS — I10 PRIMARY HYPERTENSION: ICD-10-CM

## 2022-11-04 DIAGNOSIS — E11.69 HYPERLIPIDEMIA ASSOCIATED WITH TYPE 2 DIABETES MELLITUS (HCC): ICD-10-CM

## 2022-11-04 DIAGNOSIS — E29.1 HYPOGONADISM IN MALE: ICD-10-CM

## 2022-11-04 DIAGNOSIS — I25.10 CORONARY ARTERY DISEASE INVOLVING NATIVE CORONARY ARTERY OF NATIVE HEART WITHOUT ANGINA PECTORIS: ICD-10-CM

## 2022-11-04 DIAGNOSIS — D58.2 ELEVATED HEMOGLOBIN (HCC): ICD-10-CM

## 2022-11-04 DIAGNOSIS — E03.9 ACQUIRED HYPOTHYROIDISM: ICD-10-CM

## 2022-11-04 DIAGNOSIS — E11.42 DIABETIC PERIPHERAL NEUROPATHY (HCC): ICD-10-CM

## 2022-11-04 PROCEDURE — 99214 OFFICE O/P EST MOD 30 MIN: CPT | Performed by: INTERNAL MEDICINE

## 2022-11-04 RX ORDER — SEMAGLUTIDE 2.68 MG/ML
INJECTION, SOLUTION SUBCUTANEOUS
COMMUNITY
Start: 2022-11-01

## 2022-11-04 RX ORDER — PREGABALIN 150 MG/1
150 CAPSULE ORAL 2 TIMES DAILY
Qty: 60 CAPSULE | Refills: 2 | Status: SHIPPED | OUTPATIENT
Start: 2022-12-01 | End: 2023-02-23

## 2022-11-04 ASSESSMENT — FIBROSIS 4 INDEX: FIB4 SCORE: 1.35

## 2022-11-08 ENCOUNTER — HOSPITAL ENCOUNTER (OUTPATIENT)
Dept: LAB | Facility: MEDICAL CENTER | Age: 67
End: 2022-11-08
Attending: INTERNAL MEDICINE
Payer: COMMERCIAL

## 2022-11-08 LAB
25(OH)D3 SERPL-MCNC: 48 NG/ML (ref 30–100)
ANION GAP SERPL CALC-SCNC: 14 MMOL/L (ref 7–16)
BUN SERPL-MCNC: 26 MG/DL (ref 8–22)
CALCIUM SERPL-MCNC: 9.4 MG/DL (ref 8.5–10.5)
CHLORIDE SERPL-SCNC: 101 MMOL/L (ref 96–112)
CHOLEST SERPL-MCNC: 136 MG/DL (ref 100–199)
CO2 SERPL-SCNC: 23 MMOL/L (ref 20–33)
CREAT SERPL-MCNC: 1.12 MG/DL (ref 0.5–1.4)
CREAT UR-MCNC: 85.77 MG/DL
FASTING STATUS PATIENT QL REPORTED: NORMAL
GFR SERPLBLD CREATININE-BSD FMLA CKD-EPI: 72 ML/MIN/1.73 M 2
GLUCOSE SERPL-MCNC: 151 MG/DL (ref 65–99)
HCT VFR BLD AUTO: 50.5 % (ref 42–52)
HDLC SERPL-MCNC: 31 MG/DL
HGB BLD-MCNC: 15 G/DL (ref 14–18)
LDLC SERPL CALC-MCNC: 57 MG/DL
MICROALBUMIN UR-MCNC: 54.7 MG/DL
MICROALBUMIN/CREAT UR: 638 MG/G (ref 0–30)
POTASSIUM SERPL-SCNC: 5 MMOL/L (ref 3.6–5.5)
SODIUM SERPL-SCNC: 138 MMOL/L (ref 135–145)
TESTOST SERPL-MCNC: 291 NG/DL (ref 175–781)
TRIGL SERPL-MCNC: 240 MG/DL (ref 0–149)
VIT B12 SERPL-MCNC: 966 PG/ML (ref 211–911)

## 2022-11-08 PROCEDURE — 80048 BASIC METABOLIC PNL TOTAL CA: CPT

## 2022-11-08 PROCEDURE — 82043 UR ALBUMIN QUANTITATIVE: CPT

## 2022-11-08 PROCEDURE — 85018 HEMOGLOBIN: CPT

## 2022-11-08 PROCEDURE — 84403 ASSAY OF TOTAL TESTOSTERONE: CPT

## 2022-11-08 PROCEDURE — 80061 LIPID PANEL: CPT

## 2022-11-08 PROCEDURE — 82306 VITAMIN D 25 HYDROXY: CPT

## 2022-11-08 PROCEDURE — 36415 COLL VENOUS BLD VENIPUNCTURE: CPT

## 2022-11-08 PROCEDURE — 85014 HEMATOCRIT: CPT

## 2022-11-08 PROCEDURE — 82570 ASSAY OF URINE CREATININE: CPT

## 2022-11-08 PROCEDURE — 82607 VITAMIN B-12: CPT

## 2022-11-08 PROCEDURE — 84270 ASSAY OF SEX HORMONE GLOBUL: CPT

## 2022-11-10 LAB — SHBG SERPL-SCNC: 37 NMOL/L (ref 19–76)

## 2022-11-16 PROCEDURE — RXMED WILLOW AMBULATORY MEDICATION CHARGE: Performed by: INTERNAL MEDICINE

## 2022-11-23 ENCOUNTER — PHARMACY VISIT (OUTPATIENT)
Dept: PHARMACY | Facility: MEDICAL CENTER | Age: 67
End: 2022-11-23
Payer: COMMERCIAL

## 2022-12-21 ENCOUNTER — PHARMACY VISIT (OUTPATIENT)
Dept: PHARMACY | Facility: MEDICAL CENTER | Age: 67
End: 2022-12-21
Payer: COMMERCIAL

## 2022-12-21 PROCEDURE — RXMED WILLOW AMBULATORY MEDICATION CHARGE: Performed by: INTERNAL MEDICINE

## 2023-01-11 PROCEDURE — RXMED WILLOW AMBULATORY MEDICATION CHARGE: Performed by: INTERNAL MEDICINE

## 2023-01-18 ENCOUNTER — PHARMACY VISIT (OUTPATIENT)
Dept: PHARMACY | Facility: MEDICAL CENTER | Age: 68
End: 2023-01-18
Payer: COMMERCIAL

## 2023-02-15 ENCOUNTER — PHARMACY VISIT (OUTPATIENT)
Dept: PHARMACY | Facility: MEDICAL CENTER | Age: 68
End: 2023-02-15
Payer: COMMERCIAL

## 2023-02-15 PROCEDURE — RXMED WILLOW AMBULATORY MEDICATION CHARGE: Performed by: INTERNAL MEDICINE

## 2023-03-01 ENCOUNTER — OFFICE VISIT (OUTPATIENT)
Dept: MEDICAL GROUP | Facility: PHYSICIAN GROUP | Age: 68
End: 2023-03-01
Payer: COMMERCIAL

## 2023-03-01 VITALS
SYSTOLIC BLOOD PRESSURE: 140 MMHG | OXYGEN SATURATION: 94 % | HEIGHT: 74 IN | WEIGHT: 225 LBS | HEART RATE: 100 BPM | RESPIRATION RATE: 14 BRPM | DIASTOLIC BLOOD PRESSURE: 90 MMHG | BODY MASS INDEX: 28.88 KG/M2 | TEMPERATURE: 96.8 F

## 2023-03-01 DIAGNOSIS — J20.9 ACUTE BRONCHITIS, UNSPECIFIED ORGANISM: ICD-10-CM

## 2023-03-01 PROCEDURE — 99213 OFFICE O/P EST LOW 20 MIN: CPT | Performed by: INTERNAL MEDICINE

## 2023-03-01 RX ORDER — FLUTICASONE PROPIONATE AND SALMETEROL 100; 50 UG/1; UG/1
1 POWDER RESPIRATORY (INHALATION) EVERY 12 HOURS
Qty: 60 EACH | Refills: 3 | Status: SHIPPED | OUTPATIENT
Start: 2023-03-01 | End: 2024-01-04 | Stop reason: SDUPTHER

## 2023-03-01 RX ORDER — AZITHROMYCIN 250 MG/1
TABLET, FILM COATED ORAL
Qty: 6 TABLET | Refills: 0 | Status: SHIPPED | OUTPATIENT
Start: 2023-03-01 | End: 2023-12-31

## 2023-03-01 RX ORDER — SEMAGLUTIDE 1.34 MG/ML
INJECTION, SOLUTION SUBCUTANEOUS
COMMUNITY
Start: 2022-12-29 | End: 2023-12-31

## 2023-03-01 RX ORDER — METHYLPREDNISOLONE 4 MG/1
TABLET ORAL
Qty: 21 TABLET | Refills: 0 | Status: SHIPPED | OUTPATIENT
Start: 2023-03-01 | End: 2023-12-31

## 2023-03-01 NOTE — PROGRESS NOTES
"Subjective:     CC:   Chief Complaint   Patient presents with    Follow-Up         HPI:   Raheem presents today for follow-up visit. The patient reports productive cough and shortness of breath.  He denies fevers, chills, malaise, body aches, sore throat, headache.  He has a longstanding smoking history.  He otherwise feels well.    Past Medical History:   Diagnosis Date    Cardiovascular disease     Coronary artery disease     Diabetes (HCC)     Diabetic neuropathy (HCC)     Hyperlipidemia     Hypertension     Thyroid disease        Social History     Tobacco Use    Smoking status: Every Day     Packs/day: 0.75     Years: 47.00     Pack years: 35.25     Types: Cigarettes    Smokeless tobacco: Never   Vaping Use    Vaping Use: Never used   Substance Use Topics    Alcohol use: Not Currently    Drug use: Yes     Types: Marijuana     Comment: pt uses THC 5mg tablets everning to sleep        Current Outpatient Medications Ordered in Epic   Medication Sig Dispense Refill    azithromycin (ZITHROMAX) 250 MG Tab Take two tablets on the first day, then one tablet daily for 4 days. 6 Tablet 0    methylPREDNISolone (MEDROL DOSEPAK) 4 MG Tablet Therapy Pack As directed on the packaging label. 21 Tablet 0    fluticasone-salmeterol (ADVAIR) 100-50 MCG/ACT AEROSOL POWDER, BREATH ACTIVATED Inhale 1 Puff every 12 hours. 60 Each 3    testosterone cypionate (DEPO-TESTOSTERONE) 200 MG/ML Solution injection Inject 1 mL into the shoulder, thigh, or buttocks every 14 days. 6 mL 3    OZEMPIC, 2 MG/DOSE, 8 MG/3ML Solution Pen-injector       testosterone cypionate (DEPO-TESTOSTERONE) 200 MG/ML Solution injection Inject 0.75 mL into the shoulder, thigh, or buttocks every 14 days 2 mL 5    SYRINGE-NEEDLE, DISP, 3 ML (B-D 3CC LUER-KRISTAN SYR 85OQ6-3/2) 18G X 1-1/2\" 3 ML Misc Use as directed to draw up testosterone every 14 days. 6 Each 3    SYRINGE-NEEDLE, DISP, 3 ML (B-D 3CC LUER-KRISTAN SYR 25GX1/2\") 25G X 1-1/2\" 3 ML Misc Use as directed to inject " "testosterone every 14 days. 6 Each 3    testosterone cypionate (DEPO-TESTOSTERONE) 200 MG/ML Solution injection Inject 150 mg (0.75 ml ) intramuscularly every 14 days for 28 days 2 mL 5    losartan (COZAAR) 50 MG Tab TAKE ONE TABLET BY MOUTH DAILY 90 tablet 1    metFORMIN ER (GLUCOPHAGE XR) 500 MG TABLET SR 24 HR TAKE TWO TABLETS BY MOUTH TWICE A  tablet 1    pioglitazone (ACTOS) 30 MG Tab TAKE ONE TABLET BY MOUTH DAILY 90 tablet 0    atenolol (TENORMIN) 50 MG Tab TAKE 1 TAB BY MOUTH EVERY DAY. 90 Tab 2    atorvastatin (LIPITOR) 40 MG Tab TAKE ONE TABLET BY MOUTH DAILY 90 Tab 2    levothyroxine (SYNTHROID) 75 MCG Tab Take 1 Tab by mouth Every morning on an empty stomach. 90 Tab 2    Cholecalciferol (VITAMIN D) 50 MCG (2000 UT) Cap Take  by mouth.      Ascorbic Acid (VITAMIN C) 1000 MG Tab Take  by mouth.      Omega-3 Fatty Acids (OMEGA-3 FISH OIL) 1200 MG Cap Take  by mouth.      Dapagliflozin Propanediol (FARXIGA) 10 MG Tab Take 1 tablet by mouth every day. 90 Tab 3    aspirin EC (ECOTRIN) 325 MG Tablet Delayed Response Take 325 mg by mouth every day.      sildenafil citrate (VIAGRA) 100 MG tablet Take 100 mg by mouth as needed.      Coenzyme Q10 (CO Q 10 PO) Take  by mouth.      OZEMPIC, 1 MG/DOSE, 4 MG/3ML Solution Pen-injector        No current Epic-ordered facility-administered medications on file.       Allergies:  Benicar [olmesartan]    Health Maintenance: Completed    Review of Systems:  No fevers or chills. No cough, chest pain, or shortness of breath.       Objective:       Exam:  BP (!) 140/90   Pulse 100   Temp 36 °C (96.8 °F) (Temporal)   Resp 14   Ht 1.88 m (6' 2\")   Wt 102 kg (225 lb)   SpO2 94%   BMI 28.89 kg/m²  Body mass index is 28.89 kg/m².    Gen: Alert and oriented, No apparent distress.  Lungs: Scattered expiratory wheezes  CV: Regular rate and rhythm. No murmurs, rubs, or gallops.  Ext: No clubbing, cyanosis, edema.        Assessment & Plan:     67 y.o. male with the following " -      Acute bronchitis, unspecified organism  Acute medical condition.  The patient reports productive cough and shortness of breath.  He denies fevers, chills, malaise, body aches, sore throat, headache.  He has a longstanding smoking history.  Exam notable for wheezing.  -Will treat for acute bronchitis with a Z-Byron and Medrol Dosepak as well as Advair inhaler twice daily  - azithromycin (ZITHROMAX) 250 MG Tab; Take two tablets on the first day, then one tablet daily for 4 days.  Dispense: 6 Tablet; Refill: 0  - methylPREDNISolone (MEDROL DOSEPAK) 4 MG Tablet Therapy Pack; As directed on the packaging label.  Dispense: 21 Tablet; Refill: 0  - fluticasone-salmeterol (ADVAIR) 100-50 MCG/ACT AEROSOL POWDER, BREATH ACTIVATED; Inhale 1 Puff every 12 hours.  Dispense: 60 Each; Refill: 3    Return in about 3 months (around 6/1/2023) for Controlled Substance.    Please note that this dictation was created using voice recognition software. I have made every reasonable attempt to correct obvious errors, but I expect that there are errors of grammar and possibly content that I did not discover before finalizing the note.

## 2023-03-08 ENCOUNTER — HOSPITAL ENCOUNTER (OUTPATIENT)
Dept: LAB | Facility: MEDICAL CENTER | Age: 68
End: 2023-03-08
Attending: INTERNAL MEDICINE
Payer: COMMERCIAL

## 2023-03-08 LAB
25(OH)D3 SERPL-MCNC: 60 NG/ML (ref 30–100)
ANION GAP SERPL CALC-SCNC: 13 MMOL/L (ref 7–16)
BUN SERPL-MCNC: 17 MG/DL (ref 8–22)
CALCIUM SERPL-MCNC: 9.4 MG/DL (ref 8.5–10.5)
CHLORIDE SERPL-SCNC: 100 MMOL/L (ref 96–112)
CHOLEST SERPL-MCNC: 123 MG/DL (ref 100–199)
CO2 SERPL-SCNC: 23 MMOL/L (ref 20–33)
CREAT SERPL-MCNC: 1.11 MG/DL (ref 0.5–1.4)
CREAT UR-MCNC: 101.23 MG/DL
FASTING STATUS PATIENT QL REPORTED: NORMAL
GFR SERPLBLD CREATININE-BSD FMLA CKD-EPI: 72 ML/MIN/1.73 M 2
GLUCOSE SERPL-MCNC: 180 MG/DL (ref 65–99)
HCT VFR BLD AUTO: 51.9 % (ref 42–52)
HDLC SERPL-MCNC: 29 MG/DL
HGB BLD-MCNC: 15.2 G/DL (ref 14–18)
LDLC SERPL CALC-MCNC: 46 MG/DL
MICROALBUMIN UR-MCNC: 83.6 MG/DL
MICROALBUMIN/CREAT UR: 826 MG/G (ref 0–30)
POTASSIUM SERPL-SCNC: 5 MMOL/L (ref 3.6–5.5)
SODIUM SERPL-SCNC: 136 MMOL/L (ref 135–145)
TESTOST SERPL-MCNC: 1048 NG/DL (ref 175–781)
TRIGL SERPL-MCNC: 238 MG/DL (ref 0–149)
VIT B12 SERPL-MCNC: 828 PG/ML (ref 211–911)

## 2023-03-08 PROCEDURE — 82043 UR ALBUMIN QUANTITATIVE: CPT

## 2023-03-08 PROCEDURE — 84403 ASSAY OF TOTAL TESTOSTERONE: CPT

## 2023-03-08 PROCEDURE — 82570 ASSAY OF URINE CREATININE: CPT

## 2023-03-08 PROCEDURE — 85018 HEMOGLOBIN: CPT

## 2023-03-08 PROCEDURE — 36415 COLL VENOUS BLD VENIPUNCTURE: CPT

## 2023-03-08 PROCEDURE — 80061 LIPID PANEL: CPT

## 2023-03-08 PROCEDURE — 82607 VITAMIN B-12: CPT

## 2023-03-08 PROCEDURE — 85014 HEMATOCRIT: CPT

## 2023-03-08 PROCEDURE — 84270 ASSAY OF SEX HORMONE GLOBUL: CPT

## 2023-03-08 PROCEDURE — 80048 BASIC METABOLIC PNL TOTAL CA: CPT

## 2023-03-08 PROCEDURE — 82306 VITAMIN D 25 HYDROXY: CPT

## 2023-03-09 LAB — SHBG SERPL-SCNC: 42 NMOL/L (ref 19–76)

## 2023-03-15 ENCOUNTER — PHARMACY VISIT (OUTPATIENT)
Dept: PHARMACY | Facility: MEDICAL CENTER | Age: 68
End: 2023-03-15
Payer: COMMERCIAL

## 2023-03-15 PROCEDURE — RXMED WILLOW AMBULATORY MEDICATION CHARGE: Performed by: INTERNAL MEDICINE

## 2023-03-31 ENCOUNTER — OFFICE VISIT (OUTPATIENT)
Dept: URGENT CARE | Facility: PHYSICIAN GROUP | Age: 68
End: 2023-03-31
Payer: COMMERCIAL

## 2023-03-31 VITALS
WEIGHT: 225 LBS | HEART RATE: 107 BPM | RESPIRATION RATE: 18 BRPM | DIASTOLIC BLOOD PRESSURE: 80 MMHG | SYSTOLIC BLOOD PRESSURE: 140 MMHG | BODY MASS INDEX: 28.88 KG/M2 | OXYGEN SATURATION: 97 % | TEMPERATURE: 98.6 F | HEIGHT: 74 IN

## 2023-03-31 DIAGNOSIS — R06.02 SOB (SHORTNESS OF BREATH): ICD-10-CM

## 2023-03-31 DIAGNOSIS — U07.1 COVID: ICD-10-CM

## 2023-03-31 DIAGNOSIS — R68.89 FLU-LIKE SYMPTOMS: ICD-10-CM

## 2023-03-31 LAB
FLUAV RNA SPEC QL NAA+PROBE: NEGATIVE
FLUBV RNA SPEC QL NAA+PROBE: NEGATIVE
RSV RNA SPEC QL NAA+PROBE: NEGATIVE
SARS-COV-2 RNA RESP QL NAA+PROBE: POSITIVE

## 2023-03-31 PROCEDURE — 99213 OFFICE O/P EST LOW 20 MIN: CPT | Mod: CS | Performed by: NURSE PRACTITIONER

## 2023-03-31 PROCEDURE — 0241U POCT CEPHEID COV-2, FLU A/B, RSV - PCR: CPT | Performed by: NURSE PRACTITIONER

## 2023-03-31 RX ORDER — ALBUTEROL SULFATE 90 UG/1
2 AEROSOL, METERED RESPIRATORY (INHALATION) EVERY 6 HOURS PRN
Qty: 8.5 G | Refills: 0 | Status: SHIPPED | OUTPATIENT
Start: 2023-03-31

## 2023-03-31 ASSESSMENT — ENCOUNTER SYMPTOMS
WEAKNESS: 0
COUGH: 1
SINUS PAIN: 0
SHORTNESS OF BREATH: 1
NAUSEA: 0
MYALGIAS: 1
WHEEZING: 0
CONSTIPATION: 0
SORE THROAT: 0
ABDOMINAL PAIN: 0
CHILLS: 1
HEADACHES: 1
VOMITING: 0
FEVER: 1
EYE DISCHARGE: 0
NECK PAIN: 0
EYE REDNESS: 0
CARDIOVASCULAR NEGATIVE: 1
DIZZINESS: 0
DIARRHEA: 0

## 2023-04-01 NOTE — PROGRESS NOTES
Subjective     Maximo Ospina is a 67 y.o. male who presents with Body Aches (Headache,chills and sob x 1 day Covid test negative today.)            HPI  States has been experiencing flulike symptoms that started yesterday.  States experiencing headache, chills, body aches, nasal congestion, cough and shortness of breath.  Low-grade fever.  Took at home COVID test today which was negative.  History of hypertension, hyperlipidemia, diabetes.  Does take atorvastatin.  No previous history of COVID.  Tylenol.  No history of asthma or lung problems.    PMH:  has a past medical history of Cardiovascular disease, Coronary artery disease, Diabetes (HCC), Diabetic neuropathy (HCC), Hyperlipidemia, Hypertension, and Thyroid disease.  MEDS:   Current Outpatient Medications:     albuterol 108 (90 Base) MCG/ACT Aero Soln inhalation aerosol, Inhale 2 Puffs every 6 hours as needed for Shortness of Breath., Disp: 8.5 g, Rfl: 0    Nirmatrelvir&Ritonavir 300/100 20 x 150 MG & 10 x 100MG Tablet Therapy Pack, Take 300 mg nirmatrelvir (two 150 mg tablets) with 100 mg ritonavir (one 100 mg tablet) by mouth, with all three tablets taken together twice daily for 5 days., Disp: 30 Each, Rfl: 0    azithromycin (ZITHROMAX) 250 MG Tab, Take two tablets on the first day, then one tablet daily for 4 days., Disp: 6 Tablet, Rfl: 0    methylPREDNISolone (MEDROL DOSEPAK) 4 MG Tablet Therapy Pack, As directed on the packaging label., Disp: 21 Tablet, Rfl: 0    OZEMPIC, 1 MG/DOSE, 4 MG/3ML Solution Pen-injector, , Disp: , Rfl:     fluticasone-salmeterol (ADVAIR) 100-50 MCG/ACT AEROSOL POWDER, BREATH ACTIVATED, Inhale 1 Puff every 12 hours., Disp: 60 Each, Rfl: 3    testosterone cypionate (DEPO-TESTOSTERONE) 200 MG/ML Solution injection, Inject 1 mL into the shoulder, thigh, or buttocks every 14 days., Disp: 6 mL, Rfl: 3    OZEMPIC, 2 MG/DOSE, 8 MG/3ML Solution Pen-injector, , Disp: , Rfl:     testosterone cypionate (DEPO-TESTOSTERONE) 200  "MG/ML Solution injection, Inject 0.75 mL into the shoulder, thigh, or buttocks every 14 days, Disp: 2 mL, Rfl: 5    SYRINGE-NEEDLE, DISP, 3 ML (B-D 3CC LUER-KRISTAN SYR 28HN5-0/2) 18G X 1-1/2\" 3 ML Misc, Use as directed to draw up testosterone every 14 days., Disp: 6 Each, Rfl: 3    SYRINGE-NEEDLE, DISP, 3 ML (B-D 3CC LUER-KRISTAN SYR 25GX1/2\") 25G X 1-1/2\" 3 ML Misc, Use as directed to inject testosterone every 14 days., Disp: 6 Each, Rfl: 3    testosterone cypionate (DEPO-TESTOSTERONE) 200 MG/ML Solution injection, Inject 150 mg (0.75 ml ) intramuscularly every 14 days for 28 days, Disp: 2 mL, Rfl: 5    losartan (COZAAR) 50 MG Tab, TAKE ONE TABLET BY MOUTH DAILY, Disp: 90 tablet, Rfl: 1    metFORMIN ER (GLUCOPHAGE XR) 500 MG TABLET SR 24 HR, TAKE TWO TABLETS BY MOUTH TWICE A DAY, Disp: 360 tablet, Rfl: 1    pioglitazone (ACTOS) 30 MG Tab, TAKE ONE TABLET BY MOUTH DAILY, Disp: 90 tablet, Rfl: 0    atenolol (TENORMIN) 50 MG Tab, TAKE 1 TAB BY MOUTH EVERY DAY., Disp: 90 Tab, Rfl: 2    atorvastatin (LIPITOR) 40 MG Tab, TAKE ONE TABLET BY MOUTH DAILY, Disp: 90 Tab, Rfl: 2    levothyroxine (SYNTHROID) 75 MCG Tab, Take 1 Tab by mouth Every morning on an empty stomach., Disp: 90 Tab, Rfl: 2    Cholecalciferol (VITAMIN D) 50 MCG (2000 UT) Cap, Take  by mouth., Disp: , Rfl:     Ascorbic Acid (VITAMIN C) 1000 MG Tab, Take  by mouth., Disp: , Rfl:     Omega-3 Fatty Acids (OMEGA-3 FISH OIL) 1200 MG Cap, Take  by mouth., Disp: , Rfl:     Dapagliflozin Propanediol (FARXIGA) 10 MG Tab, Take 1 tablet by mouth every day., Disp: 90 Tab, Rfl: 3    aspirin EC (ECOTRIN) 325 MG Tablet Delayed Response, Take 325 mg by mouth every day., Disp: , Rfl:     sildenafil citrate (VIAGRA) 100 MG tablet, Take 100 mg by mouth as needed., Disp: , Rfl:     Coenzyme Q10 (CO Q 10 PO), Take  by mouth., Disp: , Rfl:   ALLERGIES:   Allergies   Allergen Reactions    Benicar [Olmesartan]      SURGHX:   Past Surgical History:   Procedure Laterality Date    MULTIPLE " "CORONARY ARTERY BYPASS       SOCHX:  reports that he has been smoking cigarettes. He has a 35.25 pack-year smoking history. He has never used smokeless tobacco. He reports that he does not currently use alcohol. He reports current drug use. Drug: Marijuana.  FH: Family history was reviewed, no pertinent findings to report    Review of Systems   Constitutional:  Positive for chills, fever and malaise/fatigue.   HENT:  Positive for congestion. Negative for ear pain, sinus pain and sore throat.    Eyes:  Negative for discharge and redness.   Respiratory:  Positive for cough and shortness of breath. Negative for wheezing.    Cardiovascular: Negative.    Gastrointestinal:  Negative for abdominal pain, constipation, diarrhea, nausea and vomiting.   Musculoskeletal:  Positive for myalgias. Negative for neck pain.   Skin:  Negative for itching and rash.   Neurological:  Positive for headaches. Negative for dizziness and weakness.   Endo/Heme/Allergies:  Negative for environmental allergies.   All other systems reviewed and are negative.         Objective     BP (!) 140/80   Pulse (!) 107   Temp 37 °C (98.6 °F) (Temporal)   Resp 18   Ht 1.88 m (6' 2\")   Wt 102 kg (225 lb)   SpO2 97%   BMI 28.89 kg/m²      Physical Exam  Vitals reviewed.   Constitutional:       General: He is awake. He is not in acute distress.     Appearance: He is well-developed. He is ill-appearing. He is not toxic-appearing or diaphoretic.   HENT:      Head: Normocephalic.      Right Ear: Ear canal and external ear normal. A middle ear effusion is present.      Left Ear: Ear canal and external ear normal. A middle ear effusion is present.      Nose: Congestion and rhinorrhea present.      Mouth/Throat:      Lips: Pink.      Mouth: Mucous membranes are dry.      Pharynx: Uvula midline. Posterior oropharyngeal erythema present. No pharyngeal swelling, oropharyngeal exudate or uvula swelling.   Eyes:      Conjunctiva/sclera: Conjunctivae normal.      " Pupils: Pupils are equal, round, and reactive to light.   Cardiovascular:      Rate and Rhythm: Normal rate.   Pulmonary:      Effort: Pulmonary effort is normal.      Breath sounds: Normal breath sounds and air entry.   Musculoskeletal:         General: Normal range of motion.      Cervical back: Normal range of motion and neck supple.   Skin:     General: Skin is warm and dry.   Neurological:      Mental Status: He is alert and oriented to person, place, and time.   Psychiatric:         Attention and Perception: Attention normal.         Mood and Affect: Mood normal.         Speech: Speech normal.         Behavior: Behavior normal. Behavior is cooperative.                           Assessment & Plan        1. Flu-like symptoms    - POCT CoV-2, Flu A/B, RSV by PCR    2. COVID    - Nirmatrelvir&Ritonavir 300/100 20 x 150 MG & 10 x 100MG Tablet Therapy Pack; Take 300 mg nirmatrelvir (two 150 mg tablets) with 100 mg ritonavir (one 100 mg tablet) by mouth, with all three tablets taken together twice daily for 5 days.  Dispense: 30 Each; Refill: 0    3. SOB (shortness of breath)    - albuterol 108 (90 Base) MCG/ACT Aero Soln inhalation aerosol; Inhale 2 Puffs every 6 hours as needed for Shortness of Breath.  Dispense: 8.5 g; Refill: 0    Continue to take prescribed medications as directed  Hold atorvastatin while on antiviral and resume once finished with medication  -Stay home isolated from others until COVID test resulted the follow CDC guidelines for positive cases as discussed  -Maintain hydration/water intake  -May use over the counter Tylenol/Ibuprofen as needed for fever or body aches  -Get rest  -Salt water gargle as needed for any sore throat  -May use over the counter Flonase, saline nasal spray as needed for any nasal congestion  -May use over the counter cough suppressant medications like plain Robitussin/Delsym as needed   -Monitor for fevers, cough, shortness of breath, chest pain, chest tightness,  lethargy- need re-evaluation

## 2023-04-12 PROCEDURE — RXMED WILLOW AMBULATORY MEDICATION CHARGE: Performed by: INTERNAL MEDICINE

## 2023-04-26 ENCOUNTER — PHARMACY VISIT (OUTPATIENT)
Dept: PHARMACY | Facility: MEDICAL CENTER | Age: 68
End: 2023-04-26
Payer: COMMERCIAL

## 2023-05-05 PROCEDURE — RXMED WILLOW AMBULATORY MEDICATION CHARGE: Performed by: INTERNAL MEDICINE

## 2023-05-10 ENCOUNTER — PHARMACY VISIT (OUTPATIENT)
Dept: PHARMACY | Facility: MEDICAL CENTER | Age: 68
End: 2023-05-10
Payer: COMMERCIAL

## 2023-05-11 ENCOUNTER — NON-PROVIDER VISIT (OUTPATIENT)
Dept: MEDICAL GROUP | Facility: PHYSICIAN GROUP | Age: 68
End: 2023-05-11
Payer: COMMERCIAL

## 2023-05-11 DIAGNOSIS — Z23 NEED FOR VACCINATION: ICD-10-CM

## 2023-05-11 PROCEDURE — 90471 IMMUNIZATION ADMIN: CPT | Performed by: INTERNAL MEDICINE

## 2023-05-11 PROCEDURE — 90715 TDAP VACCINE 7 YRS/> IM: CPT | Performed by: INTERNAL MEDICINE

## 2023-05-11 PROCEDURE — 90746 HEPB VACCINE 3 DOSE ADULT IM: CPT | Performed by: INTERNAL MEDICINE

## 2023-05-11 PROCEDURE — 90472 IMMUNIZATION ADMIN EACH ADD: CPT | Performed by: INTERNAL MEDICINE

## 2023-05-31 DIAGNOSIS — E11.42 DIABETIC PERIPHERAL NEUROPATHY (HCC): ICD-10-CM

## 2023-05-31 RX ORDER — PREGABALIN 150 MG/1
CAPSULE ORAL
Qty: 60 CAPSULE | Refills: 0 | Status: SHIPPED | OUTPATIENT
Start: 2023-05-31 | End: 2023-06-30

## 2023-06-08 ENCOUNTER — NON-PROVIDER VISIT (OUTPATIENT)
Dept: MEDICAL GROUP | Facility: PHYSICIAN GROUP | Age: 68
End: 2023-06-08
Payer: COMMERCIAL

## 2023-06-08 DIAGNOSIS — Z23 NEED FOR VACCINATION: ICD-10-CM

## 2023-06-08 DIAGNOSIS — E11.42 DIABETIC PERIPHERAL NEUROPATHY (HCC): ICD-10-CM

## 2023-06-08 PROCEDURE — 90632 HEPA VACCINE ADULT IM: CPT | Performed by: FAMILY MEDICINE

## 2023-06-08 PROCEDURE — 90472 IMMUNIZATION ADMIN EACH ADD: CPT | Performed by: FAMILY MEDICINE

## 2023-06-08 PROCEDURE — 90471 IMMUNIZATION ADMIN: CPT | Performed by: FAMILY MEDICINE

## 2023-06-08 PROCEDURE — 90746 HEPB VACCINE 3 DOSE ADULT IM: CPT | Performed by: FAMILY MEDICINE

## 2023-06-08 NOTE — PROGRESS NOTES
"Mxaimo Ospina is a 67 y.o. male here for a non-provider visit for:   HEPATITIS A 1 of 2    Reason for immunization: continue or complete series started at the office  Immunization records indicate need for vaccine: Yes, confirmed with Epic  Minimum interval has been met for this vaccine: No  ABN completed: No    VIS Dated  07/20/2016 was given to patient: Yes  All IAC Questionnaire questions were answered \"No.\"    Patient tolerated injection and no adverse effects were observed or reported: Yes    Pt scheduled for next dose in series: Yes    "

## 2023-06-08 NOTE — PROGRESS NOTES
"Maximo Ospina is a 67 y.o. male here for a non-provider visit for:   HEPATITIS B 2 of 3    Reason for immunization: continue or complete series started at the office  Immunization records indicate need for vaccine: Yes, confirmed with Epic  Minimum interval has been met for this vaccine: No  ABN completed: No    VIS Dated  10/15/2021 was given to patient: Yes  All IAC Questionnaire questions were answered \"No.\"    Patient tolerated injection and no adverse effects were observed or reported: Yes    Pt scheduled for next dose in series: Yes    "

## 2023-06-14 PROCEDURE — RXMED WILLOW AMBULATORY MEDICATION CHARGE: Performed by: INTERNAL MEDICINE

## 2023-06-21 ENCOUNTER — PHARMACY VISIT (OUTPATIENT)
Dept: PHARMACY | Facility: MEDICAL CENTER | Age: 68
End: 2023-06-21
Payer: COMMERCIAL

## 2023-07-05 PROCEDURE — RXMED WILLOW AMBULATORY MEDICATION CHARGE: Performed by: INTERNAL MEDICINE

## 2023-07-19 ENCOUNTER — PHARMACY VISIT (OUTPATIENT)
Dept: PHARMACY | Facility: MEDICAL CENTER | Age: 68
End: 2023-07-19
Payer: COMMERCIAL

## 2023-07-31 NOTE — PROGRESS NOTES
Subjective:     CC: No chief complaint on file.        HPI:   Raheem presents today for follow-up visit and to discuss the following issues:        Past Medical History:   Diagnosis Date    Cardiovascular disease     Coronary artery disease     Diabetes (HCC)     Diabetic neuropathy (HCC)     Hyperlipidemia     Hypertension     Thyroid disease        Social History     Tobacco Use    Smoking status: Every Day     Packs/day: 0.75     Years: 47.00     Pack years: 35.25     Types: Cigarettes    Smokeless tobacco: Never   Vaping Use    Vaping Use: Never used   Substance Use Topics    Alcohol use: Not Currently    Drug use: Yes     Types: Marijuana     Comment: pt uses THC 5mg tablets everning to sleep        Current Outpatient Medications Ordered in Epic   Medication Sig Dispense Refill    testosterone cypionate (DEPO-TESTOSTERONE) 200 MG/ML Solution injection Inject 200 mg( 1 ml) intramuscular every 14 days 150 days 10 mL 0    albuterol 108 (90 Base) MCG/ACT Aero Soln inhalation aerosol Inhale 2 Puffs every 6 hours as needed for Shortness of Breath. 8.5 g 0    Nirmatrelvir&Ritonavir 300/100 20 x 150 MG & 10 x 100MG Tablet Therapy Pack Take 300 mg nirmatrelvir (two 150 mg tablets) with 100 mg ritonavir (one 100 mg tablet) by mouth, with all three tablets taken together twice daily for 5 days. 30 Each 0    azithromycin (ZITHROMAX) 250 MG Tab Take two tablets on the first day, then one tablet daily for 4 days. 6 Tablet 0    methylPREDNISolone (MEDROL DOSEPAK) 4 MG Tablet Therapy Pack As directed on the packaging label. 21 Tablet 0    OZEMPIC, 1 MG/DOSE, 4 MG/3ML Solution Pen-injector       fluticasone-salmeterol (ADVAIR) 100-50 MCG/ACT AEROSOL POWDER, BREATH ACTIVATED Inhale 1 Puff every 12 hours. 60 Each 3    OZEMPIC, 2 MG/DOSE, 8 MG/3ML Solution Pen-injector       testosterone cypionate (DEPO-TESTOSTERONE) 200 MG/ML Solution injection Inject 0.75 mL into the shoulder, thigh, or buttocks every 14 days 2 mL 5     "SYRINGE-NEEDLE, DISP, 3 ML (B-D 3CC LUER-KRISTAN SYR 79QG0-8/2) 18G X 1-1/2\" 3 ML Misc Use as directed to draw up testosterone every 14 days. 6 Each 3    SYRINGE-NEEDLE, DISP, 3 ML (B-D 3CC LUER-KRISTAN SYR 25GX1/2\") 25G X 1-1/2\" 3 ML Misc Use as directed to inject testosterone every 14 days. 6 Each 3    testosterone cypionate (DEPO-TESTOSTERONE) 200 MG/ML Solution injection Inject 150 mg (0.75 ml ) intramuscularly every 14 days for 28 days 2 mL 5    losartan (COZAAR) 50 MG Tab TAKE ONE TABLET BY MOUTH DAILY 90 tablet 1    metFORMIN ER (GLUCOPHAGE XR) 500 MG TABLET SR 24 HR TAKE TWO TABLETS BY MOUTH TWICE A  tablet 1    pioglitazone (ACTOS) 30 MG Tab TAKE ONE TABLET BY MOUTH DAILY 90 tablet 0    atenolol (TENORMIN) 50 MG Tab TAKE 1 TAB BY MOUTH EVERY DAY. 90 Tab 2    atorvastatin (LIPITOR) 40 MG Tab TAKE ONE TABLET BY MOUTH DAILY 90 Tab 2    levothyroxine (SYNTHROID) 75 MCG Tab Take 1 Tab by mouth Every morning on an empty stomach. 90 Tab 2    Cholecalciferol (VITAMIN D) 50 MCG (2000 UT) Cap Take  by mouth.      Ascorbic Acid (VITAMIN C) 1000 MG Tab Take  by mouth.      Omega-3 Fatty Acids (OMEGA-3 FISH OIL) 1200 MG Cap Take  by mouth.      Dapagliflozin Propanediol (FARXIGA) 10 MG Tab Take 1 tablet by mouth every day. 90 Tab 3    aspirin EC (ECOTRIN) 325 MG Tablet Delayed Response Take 325 mg by mouth every day.      sildenafil citrate (VIAGRA) 100 MG tablet Take 100 mg by mouth as needed.      Coenzyme Q10 (CO Q 10 PO) Take  by mouth.       No current Epic-ordered facility-administered medications on file.       Allergies:  Benicar [olmesartan]    Health Maintenance: Completed    Review of Systems:  No fevers or chills. No cough, chest pain, or shortness of breath.       Objective:       Exam:  There were no vitals taken for this visit. There is no height or weight on file to calculate BMI.    Gen: Alert and oriented, No apparent distress.  Lungs: Normal effort, CTA bilaterally, no wheezes, rhonchi, or " rales  CV: Regular rate and rhythm. No murmurs, rubs, or gallops.  Ext: No clubbing, cyanosis, edema.        Assessment & Plan:     68 y.o. male with the following -     Hyperlipidemia associated with type 2 diabetes mellitus (HCC)  With regard to the patient's type 2 diabetes, chronic medical condition.  The patient is managed by endocrinology, Dr. Mas.  Hemoglobin A1c from 3/23/2022 was 7.1, stable from 7.  Urine microalbumin creatinine ratio on 6/15/2022 was elevated at 469.  -Continue current regimen of metformin 1000 mg twice daily, pioglitazone 30 mg daily, Farxiga 10 mg daily, and Ozempic 2 mg weekly.     With regard to the patient's hyperlipidemia, chronic medical condition.  LDL at goal of less than 70.  Lipid panel from 3/23/2022 showed a total cholesterol 114, LDL 45, HDL 30, triglyceride 27.  -Continue current regimen of atorvastatin 40 mg nightly.     Diabetic peripheral neuropathy (HCC)  Chronic medical condition.  Stable. The patient reports bilateral foot numbness and tingling.  At the patient's last visit we increased his pregabalin to 150 mg twice daily.  Review of the patient's  shows that he was last prescribed pregabalin 150 mg, dispo #60, on 11/1/2022. Obtained and reviewed patient utilization report from [facility] pharmacy database on 11/4/2022 5:41 AM  prior to writing prescription for controlled substance II, III or IV per [place] bill . Based on assessment of the report, the prescription is medically necessary.   -Controlled substance treatment agreement and signed and scanned into the patient's chart on 5/4/2022  -Urine drug screen completed on 6/15/2022  -Continue pregabalin 150 mg twice weekly   - pregabalin (LYRICA) 150 MG Cap; Take 1 Capsule by mouth 2 times a day for 30 days.  Dispense: 60 Capsule; Refill: 2     Primary hypertension  Chronic medical condition.  Ongoing and well controlled.  Blood pressure at today's visit is 124/70. He denies new headaches, chest pain,  shortness of breath, or lower extremity edema.    - Continue current regimen of atenolol 50 mg daily and losartan 50 mg daily.     Acquired hypothyroidism  Chronic medical condition.  Ongoing and well controlled.  The patient is managed by endocrinology, Dr. Mas.  TSH from 3/23/2022 was normal at 1.31 with a normal free T4 1.5.  The patient is clinically euthyroid.  -Continue current regimen of levothyroxine 75 mcg daily.     Hypogonadism in male  Elevated hemoglobin (HCC)  Chronic medical condition.  Ongoing and well controlled.  The patient is managed by endocrinology, Dr. Mas. Testosterone level from 12/20/2021 was elevated at 877.  PSA from 3/23/2022 was normal at 2.4. The patient also has elevated hemoglobin secondary to chronic TRT.  He undergoes phlebotomy every 3 months, managed by endocrinology. Hemoglobin and hematocrit from 6/15/2022 are 14.6 and 49.5.    -Continue current regimen of testosterone 150 mg every 2 weeks, managed by endocrinology.     Coronary artery disease involving native coronary artery of native heart without angina pectoris  Mixed hyperlipidemia  Chronic medical condition.  Ongoing and well controlled.  The patient had four-vessel CABG in 2007.  LDL at goal of less than 70.  Lipid panel from 3/23/2022 showed a total cholesterol 114, LDL 45, HDL 30, triglycerides 97.  -Continue current regimen of atorvastatin 40 mg nightly and  mg daily    No follow-ups on file.    Please note that this dictation was created using voice recognition software. I have made every reasonable attempt to correct obvious errors, but I expect that there are errors of grammar and possibly content that I did not discover before finalizing the note.

## 2023-08-02 ENCOUNTER — OFFICE VISIT (OUTPATIENT)
Dept: MEDICAL GROUP | Facility: PHYSICIAN GROUP | Age: 68
End: 2023-08-02
Payer: COMMERCIAL

## 2023-08-02 VITALS
DIASTOLIC BLOOD PRESSURE: 68 MMHG | WEIGHT: 221.4 LBS | HEART RATE: 70 BPM | TEMPERATURE: 97.2 F | OXYGEN SATURATION: 98 % | HEIGHT: 74 IN | BODY MASS INDEX: 28.42 KG/M2 | SYSTOLIC BLOOD PRESSURE: 128 MMHG

## 2023-08-02 DIAGNOSIS — E11.42 DIABETIC PERIPHERAL NEUROPATHY (HCC): ICD-10-CM

## 2023-08-02 DIAGNOSIS — E78.5 HYPERLIPIDEMIA ASSOCIATED WITH TYPE 2 DIABETES MELLITUS (HCC): ICD-10-CM

## 2023-08-02 DIAGNOSIS — I10 PRIMARY HYPERTENSION: ICD-10-CM

## 2023-08-02 DIAGNOSIS — E11.69 HYPERLIPIDEMIA ASSOCIATED WITH TYPE 2 DIABETES MELLITUS (HCC): ICD-10-CM

## 2023-08-02 PROCEDURE — 3074F SYST BP LT 130 MM HG: CPT | Performed by: INTERNAL MEDICINE

## 2023-08-02 PROCEDURE — 99214 OFFICE O/P EST MOD 30 MIN: CPT | Performed by: INTERNAL MEDICINE

## 2023-08-02 PROCEDURE — 3078F DIAST BP <80 MM HG: CPT | Performed by: INTERNAL MEDICINE

## 2023-08-02 RX ORDER — PREGABALIN 150 MG/1
150 CAPSULE ORAL 2 TIMES DAILY
Qty: 60 CAPSULE | Refills: 2 | Status: SHIPPED | OUTPATIENT
Start: 2023-08-02 | End: 2023-10-06

## 2023-08-02 RX ORDER — PREGABALIN 150 MG/1
CAPSULE ORAL
COMMUNITY
Start: 2023-07-05 | End: 2023-12-31

## 2023-08-02 ASSESSMENT — PATIENT HEALTH QUESTIONNAIRE - PHQ9: CLINICAL INTERPRETATION OF PHQ2 SCORE: 0

## 2023-08-16 ENCOUNTER — PHARMACY VISIT (OUTPATIENT)
Dept: PHARMACY | Facility: MEDICAL CENTER | Age: 68
End: 2023-08-16
Payer: COMMERCIAL

## 2023-08-16 PROCEDURE — RXMED WILLOW AMBULATORY MEDICATION CHARGE: Performed by: INTERNAL MEDICINE

## 2023-09-13 ENCOUNTER — PHARMACY VISIT (OUTPATIENT)
Dept: PHARMACY | Facility: MEDICAL CENTER | Age: 68
End: 2023-09-13
Payer: COMMERCIAL

## 2023-09-13 PROCEDURE — RXMED WILLOW AMBULATORY MEDICATION CHARGE: Performed by: INTERNAL MEDICINE

## 2023-09-18 ENCOUNTER — HOSPITAL ENCOUNTER (OUTPATIENT)
Dept: LAB | Facility: MEDICAL CENTER | Age: 68
End: 2023-09-18
Attending: INTERNAL MEDICINE
Payer: COMMERCIAL

## 2023-09-18 LAB
25(OH)D3 SERPL-MCNC: 43 NG/ML (ref 30–100)
ANION GAP SERPL CALC-SCNC: 13 MMOL/L (ref 7–16)
BUN SERPL-MCNC: 23 MG/DL (ref 8–22)
CALCIUM SERPL-MCNC: 9.7 MG/DL (ref 8.5–10.5)
CHLORIDE SERPL-SCNC: 101 MMOL/L (ref 96–112)
CHOLEST SERPL-MCNC: 115 MG/DL (ref 100–199)
CO2 SERPL-SCNC: 21 MMOL/L (ref 20–33)
CREAT SERPL-MCNC: 1.43 MG/DL (ref 0.5–1.4)
CREAT UR-MCNC: 89.72 MG/DL
FASTING STATUS PATIENT QL REPORTED: NORMAL
GFR SERPLBLD CREATININE-BSD FMLA CKD-EPI: 53 ML/MIN/1.73 M 2
GLUCOSE SERPL-MCNC: 150 MG/DL (ref 65–99)
HCT VFR BLD AUTO: 52.9 % (ref 42–52)
HDLC SERPL-MCNC: 29 MG/DL
HGB BLD-MCNC: 15.3 G/DL (ref 14–18)
LDLC SERPL CALC-MCNC: 46 MG/DL
MICROALBUMIN UR-MCNC: 29.7 MG/DL
MICROALBUMIN/CREAT UR: 331 MG/G (ref 0–30)
POTASSIUM SERPL-SCNC: 5.5 MMOL/L (ref 3.6–5.5)
SODIUM SERPL-SCNC: 135 MMOL/L (ref 135–145)
T3FREE SERPL-MCNC: 2.99 PG/ML (ref 2–4.4)
T4 FREE SERPL-MCNC: 1.27 NG/DL (ref 0.93–1.7)
TESTOST SERPL-MCNC: 1199 NG/DL (ref 175–781)
TRIGL SERPL-MCNC: 202 MG/DL (ref 0–149)
TSH SERPL DL<=0.005 MIU/L-ACNC: 1.3 UIU/ML (ref 0.38–5.33)
VIT B12 SERPL-MCNC: 1333 PG/ML (ref 211–911)

## 2023-09-18 PROCEDURE — 82043 UR ALBUMIN QUANTITATIVE: CPT

## 2023-09-18 PROCEDURE — 80048 BASIC METABOLIC PNL TOTAL CA: CPT

## 2023-09-18 PROCEDURE — 84270 ASSAY OF SEX HORMONE GLOBUL: CPT

## 2023-09-18 PROCEDURE — 82306 VITAMIN D 25 HYDROXY: CPT

## 2023-09-18 PROCEDURE — 85018 HEMOGLOBIN: CPT

## 2023-09-18 PROCEDURE — 84403 ASSAY OF TOTAL TESTOSTERONE: CPT

## 2023-09-18 PROCEDURE — 80061 LIPID PANEL: CPT

## 2023-09-18 PROCEDURE — 84443 ASSAY THYROID STIM HORMONE: CPT

## 2023-09-18 PROCEDURE — 85014 HEMATOCRIT: CPT

## 2023-09-18 PROCEDURE — 82570 ASSAY OF URINE CREATININE: CPT

## 2023-09-18 PROCEDURE — 82607 VITAMIN B-12: CPT

## 2023-09-18 PROCEDURE — 84439 ASSAY OF FREE THYROXINE: CPT

## 2023-09-18 PROCEDURE — 84481 FREE ASSAY (FT-3): CPT

## 2023-09-18 PROCEDURE — 36415 COLL VENOUS BLD VENIPUNCTURE: CPT

## 2023-09-19 LAB — SHBG SERPL-SCNC: 39 NMOL/L (ref 19–76)

## 2023-09-24 NOTE — PROGRESS NOTES
Subjective:     CC:   Chief Complaint   Patient presents with    Medication Refill     pregabalin (LYRICA) 150 MG Cap    Other     Wants to see if he can get a testosterone shot here today          HPI:   Raheem presents today for follow-up visit for medication refill.  He feels well and has no acute medical complaints. The patient is managed by endocrinology, Dr. Mas.  He will continue on his current regimen of metformin 1000 mg twice daily, pioglitazone 30 mg daily, Farxiga 10 mg daily, and Ozempic 2 mg weekly.  Reviewed his lipid panel on 3/23/2023 which showed a total cholesterol 123, LDL 46, HDL 29, triglycerides 238.  He will continue on his current regimen of atorvastatin 40 mg nightly.  He is due for a refill of his pregabalin, his current dose is 150 mg twice daily.      Past Medical History:   Diagnosis Date    Cardiovascular disease     Coronary artery disease     Diabetes (HCC)     Diabetic neuropathy (HCC)     Hyperlipidemia     Hypertension     Thyroid disease        Social History     Tobacco Use    Smoking status: Every Day     Current packs/day: 0.75     Average packs/day: 0.8 packs/day for 47.0 years (35.3 ttl pk-yrs)     Types: Cigarettes    Smokeless tobacco: Never   Vaping Use    Vaping Use: Never used   Substance Use Topics    Alcohol use: Not Currently    Drug use: Yes     Types: Marijuana     Comment: pt uses THC 5mg tablets everning to sleep        Current Outpatient Medications Ordered in Epic   Medication Sig Dispense Refill    pregabalin (LYRICA) 150 MG Cap       pregabalin (LYRICA) 150 MG Cap Take 1 Capsule by mouth 2 times a day for 90 days. 60 Capsule 2    testosterone cypionate (DEPO-TESTOSTERONE) 200 MG/ML Solution injection Inject 200 mg( 1 ml) intramuscular every 14 days 150 days 10 mL 0    albuterol 108 (90 Base) MCG/ACT Aero Soln inhalation aerosol Inhale 2 Puffs every 6 hours as needed for Shortness of Breath. 8.5 g 0    Nirmatrelvir&Ritonavir 300/100 20 x 150 MG & 10 x  "100MG Tablet Therapy Pack Take 300 mg nirmatrelvir (two 150 mg tablets) with 100 mg ritonavir (one 100 mg tablet) by mouth, with all three tablets taken together twice daily for 5 days. 30 Each 0    azithromycin (ZITHROMAX) 250 MG Tab Take two tablets on the first day, then one tablet daily for 4 days. 6 Tablet 0    methylPREDNISolone (MEDROL DOSEPAK) 4 MG Tablet Therapy Pack As directed on the packaging label. 21 Tablet 0    OZEMPIC, 1 MG/DOSE, 4 MG/3ML Solution Pen-injector       fluticasone-salmeterol (ADVAIR) 100-50 MCG/ACT AEROSOL POWDER, BREATH ACTIVATED Inhale 1 Puff every 12 hours. 60 Each 3    OZEMPIC, 2 MG/DOSE, 8 MG/3ML Solution Pen-injector       testosterone cypionate (DEPO-TESTOSTERONE) 200 MG/ML Solution injection Inject 0.75 mL into the shoulder, thigh, or buttocks every 14 days 2 mL 5    SYRINGE-NEEDLE, DISP, 3 ML (B-D 3CC LUER-KRISTAN SYR 50WL1-9/2) 18G X 1-1/2\" 3 ML Misc Use as directed to draw up testosterone every 14 days. 6 Each 3    SYRINGE-NEEDLE, DISP, 3 ML (B-D 3CC LUER-KRISTAN SYR 25GX1/2\") 25G X 1-1/2\" 3 ML Misc Use as directed to inject testosterone every 14 days. 6 Each 3    testosterone cypionate (DEPO-TESTOSTERONE) 200 MG/ML Solution injection Inject 150 mg (0.75 ml ) intramuscularly every 14 days for 28 days 2 mL 5    losartan (COZAAR) 50 MG Tab TAKE ONE TABLET BY MOUTH DAILY 90 tablet 1    metFORMIN ER (GLUCOPHAGE XR) 500 MG TABLET SR 24 HR TAKE TWO TABLETS BY MOUTH TWICE A  tablet 1    pioglitazone (ACTOS) 30 MG Tab TAKE ONE TABLET BY MOUTH DAILY 90 tablet 0    atenolol (TENORMIN) 50 MG Tab TAKE 1 TAB BY MOUTH EVERY DAY. 90 Tab 2    atorvastatin (LIPITOR) 40 MG Tab TAKE ONE TABLET BY MOUTH DAILY 90 Tab 2    levothyroxine (SYNTHROID) 75 MCG Tab Take 1 Tab by mouth Every morning on an empty stomach. 90 Tab 2    Cholecalciferol (VITAMIN D) 50 MCG (2000 UT) Cap Take  by mouth.      Ascorbic Acid (VITAMIN C) 1000 MG Tab Take  by mouth.      Omega-3 Fatty Acids (OMEGA-3 FISH OIL) 1200 MG " "Cap Take  by mouth.      Dapagliflozin Propanediol (FARXIGA) 10 MG Tab Take 1 tablet by mouth every day. 90 Tab 3    aspirin EC (ECOTRIN) 325 MG Tablet Delayed Response Take 325 mg by mouth every day.      sildenafil citrate (VIAGRA) 100 MG tablet Take 100 mg by mouth as needed.      Coenzyme Q10 (CO Q 10 PO) Take  by mouth.       No current Epic-ordered facility-administered medications on file.       Allergies:  Benicar [olmesartan]    Health Maintenance: Completed    Review of Systems:  No fevers or chills. No cough, chest pain, or shortness of breath.       Objective:       Exam:  /68   Pulse 70   Temp 36.2 °C (97.2 °F) (Temporal)   Ht 1.88 m (6' 2\")   Wt 100 kg (221 lb 6.4 oz)   SpO2 98%   BMI 28.43 kg/m²  Body mass index is 28.43 kg/m².    Gen: Alert and oriented, No apparent distress.  Lungs: Normal effort, CTA bilaterally, no wheezes, rhonchi, or rales  CV: Regular rate and rhythm. No murmurs, rubs, or gallops.  Ext: No clubbing, cyanosis, edema.    Monofilament testing with a 10 gram force: sensation intact: decreased bilaterally  Visual Inspection: Feet without maceration, ulcers, fissures.  Pedal pulses: decreased bilaterally      Assessment & Plan:     68 y.o. male with the following -     Hyperlipidemia associated with type 2 diabetes mellitus (HCC)  With regard to the patient's type 2 diabetes, chronic medical condition.  The patient is managed by endocrinology, Dr. Mas.  Hemoglobin A1c from 3/23/2022 was 7.1, stable from 7.  Urine microalbumin creatinine ratio on 3/8/2023 was elevated at 826.  -Continue current regimen of metformin 1000 mg twice daily, pioglitazone 30 mg daily, Farxiga 10 mg daily, and Ozempic 2 mg weekly.  - Diabetic Monofilament Lower Extremity Exam    With regard to the patient's hyperlipidemia, chronic medical condition.  The patient currently takes atorvastatin 40 mg nightly.  He denies statin associated myalgias.  Lipid panel on 323 showed a total cholesterol " 123, LDL 46, HDL 29, triglycerides 238.  -Continue current regimen of atorvastatin 40 mg nightly     Diabetic peripheral neuropathy (HCC)  Chronic condition, controlled.  The patient reports bilateral foot numbness and tingling.  At the patient's last visit we increased his pregabalin to 150 mg twice daily.  Review of the patient's  shows that he was last prescribed pregabalin 150 mg, dispo #60, on 7/5/2023. Obtained and reviewed patient utilization report from [facility] pharmacy database on 8/2/2023 7:56 AM  prior to writing prescription for controlled substance II, III or IV per [place] bill . Based on assessment of the report, the prescription is medically necessary.   -Controlled substance treatment agreement and signed and scanned into the patient's chart on 5/4/2022  -Urine drug screen completed on 6/15/2022  -Continue pregabalin 150 mg twice daily  - pregabalin (LYRICA) 150 MG Cap; Take 1 Capsule by mouth 2 times a day for 90 days.  Dispense: 60 Capsule; Refill: 2     Primary hypertension  Chronic condition, controlled.  The patient currently takes atenolol 50 mg daily and losartan 50 mg daily.  Blood pressure at today's visit is 128/68. He denies new headaches, chest pain, shortness of breath, or lower extremity edema.    - Continue current regimen of atenolol 50 mg daily and losartan 50 mg daily    Return in about 3 months (around 11/2/2023) for 3-month f/u visit.    Please note that this dictation was created using voice recognition software. I have made every reasonable attempt to correct obvious errors, but I expect that there are errors of grammar and possibly content that I did not discover before finalizing the note.

## 2023-10-11 ENCOUNTER — PHARMACY VISIT (OUTPATIENT)
Dept: PHARMACY | Facility: MEDICAL CENTER | Age: 68
End: 2023-10-11
Payer: COMMERCIAL

## 2023-10-11 PROCEDURE — RXMED WILLOW AMBULATORY MEDICATION CHARGE: Performed by: INTERNAL MEDICINE

## 2023-11-01 PROCEDURE — RXMED WILLOW AMBULATORY MEDICATION CHARGE: Performed by: INTERNAL MEDICINE

## 2023-11-15 ENCOUNTER — PHARMACY VISIT (OUTPATIENT)
Dept: PHARMACY | Facility: MEDICAL CENTER | Age: 68
End: 2023-11-15
Payer: COMMERCIAL

## 2023-12-03 NOTE — PROGRESS NOTES
Subjective:     CC:   Chief Complaint   Patient presents with    Follow-Up         HPI:   Raheem Ospina is a 68-year-old male who presents for follow-up visit for medication refill.  He feels well and has no acute medical complaints.  He continues to be stable on his pregabalin 150 mg twice daily for his diabetic peripheral neuropathy.  He is also stable on his diabetes medications as managed by his endocrinologist, I do not have the most recent lab results available for review.      Past Medical History:   Diagnosis Date    Cardiovascular disease     Coronary artery disease     Diabetes (HCC)     Diabetic neuropathy (HCC)     Hyperlipidemia     Hypertension     Thyroid disease        Social History     Tobacco Use    Smoking status: Every Day     Current packs/day: 0.75     Average packs/day: 0.8 packs/day for 47.0 years (35.3 ttl pk-yrs)     Types: Cigarettes    Smokeless tobacco: Never   Vaping Use    Vaping Use: Never used   Substance Use Topics    Alcohol use: Not Currently    Drug use: Yes     Types: Marijuana     Comment: pt uses THC 5mg tablets everning to sleep        Current Outpatient Medications Ordered in Epic   Medication Sig Dispense Refill    diclofenac DR (VOLTAREN) 75 MG Tablet Delayed Response Take 1 Tablet by mouth 2 times a day. 60 Tablet 2    [START ON 12/22/2023] pregabalin (LYRICA) 150 MG Cap Take 1 Capsule by mouth 2 times a day for 90 days. 180 Capsule 0    testosterone cypionate (DEPO-TESTOSTERONE) 200 MG/ML injection Inject 1 mL into the shoulder, thigh, or buttocks every 14 days for 84 days. 6 mL 1    pregabalin (LYRICA) 150 MG Cap TAKE 1 CAPSULE BY MOUTH TWO TIMES A DAY FOR 90 DAYS. 60 Capsule 2    pregabalin (LYRICA) 150 MG Cap       albuterol 108 (90 Base) MCG/ACT Aero Soln inhalation aerosol Inhale 2 Puffs every 6 hours as needed for Shortness of Breath. 8.5 g 0    Nirmatrelvir&Ritonavir 300/100 20 x 150 MG & 10 x 100MG Tablet Therapy Pack Take 300 mg nirmatrelvir (two  "150 mg tablets) with 100 mg ritonavir (one 100 mg tablet) by mouth, with all three tablets taken together twice daily for 5 days. 30 Each 0    azithromycin (ZITHROMAX) 250 MG Tab Take two tablets on the first day, then one tablet daily for 4 days. 6 Tablet 0    methylPREDNISolone (MEDROL DOSEPAK) 4 MG Tablet Therapy Pack As directed on the packaging label. 21 Tablet 0    OZEMPIC, 1 MG/DOSE, 4 MG/3ML Solution Pen-injector       fluticasone-salmeterol (ADVAIR) 100-50 MCG/ACT AEROSOL POWDER, BREATH ACTIVATED Inhale 1 Puff every 12 hours. 60 Each 3    OZEMPIC, 2 MG/DOSE, 8 MG/3ML Solution Pen-injector       testosterone cypionate (DEPO-TESTOSTERONE) 200 MG/ML Solution injection Inject 0.75 mL into the shoulder, thigh, or buttocks every 14 days 2 mL 5    SYRINGE-NEEDLE, DISP, 3 ML (B-D 3CC LUER-KRISTAN SYR 32WL7-7/2) 18G X 1-1/2\" 3 ML Misc Use as directed to draw up testosterone every 14 days. 6 Each 3    SYRINGE-NEEDLE, DISP, 3 ML (B-D 3CC LUER-KRISTAN SYR 25GX1/2\") 25G X 1-1/2\" 3 ML Misc Use as directed to inject testosterone every 14 days. 6 Each 3    testosterone cypionate (DEPO-TESTOSTERONE) 200 MG/ML Solution injection Inject 150 mg (0.75 ml ) intramuscularly every 14 days for 28 days 2 mL 5    losartan (COZAAR) 50 MG Tab TAKE ONE TABLET BY MOUTH DAILY 90 tablet 1    metFORMIN ER (GLUCOPHAGE XR) 500 MG TABLET SR 24 HR TAKE TWO TABLETS BY MOUTH TWICE A  tablet 1    pioglitazone (ACTOS) 30 MG Tab TAKE ONE TABLET BY MOUTH DAILY 90 tablet 0    atenolol (TENORMIN) 50 MG Tab TAKE 1 TAB BY MOUTH EVERY DAY. 90 Tab 2    atorvastatin (LIPITOR) 40 MG Tab TAKE ONE TABLET BY MOUTH DAILY 90 Tab 2    levothyroxine (SYNTHROID) 75 MCG Tab Take 1 Tab by mouth Every morning on an empty stomach. 90 Tab 2    Cholecalciferol (VITAMIN D) 50 MCG (2000 UT) Cap Take  by mouth.      Ascorbic Acid (VITAMIN C) 1000 MG Tab Take  by mouth.      Omega-3 Fatty Acids (OMEGA-3 FISH OIL) 1200 MG Cap Take  by mouth.      Dapagliflozin Propanediol " "(FARXIGA) 10 MG Tab Take 1 tablet by mouth every day. 90 Tab 3    aspirin EC (ECOTRIN) 325 MG Tablet Delayed Response Take 325 mg by mouth every day.      sildenafil citrate (VIAGRA) 100 MG tablet Take 100 mg by mouth as needed.      Coenzyme Q10 (CO Q 10 PO) Take  by mouth.       No current Epic-ordered facility-administered medications on file.       Allergies:  Benicar [olmesartan]    Health Maintenance: Completed    Review of Systems:  No fevers or chills. No cough, chest pain, or shortness of breath.       Objective:       Exam:  /66   Pulse 79   Temp 36.6 °C (97.9 °F) (Temporal)   Resp 18   Ht 1.88 m (6' 2\")   Wt 98.4 kg (217 lb)   SpO2 91%   BMI 27.86 kg/m²  Body mass index is 27.86 kg/m².    Gen: Alert and oriented, No apparent distress.  Lungs: Normal effort, CTA bilaterally, no wheezes, rhonchi, or rales  CV: Regular rate and rhythm. No murmurs, rubs, or gallops.  Ext: No clubbing, cyanosis, edema.      Assessment & Plan:     68 y.o. male with the following -     Hyperlipidemia associated with type 2 diabetes mellitus (HCC)  With regard to the patient's type 2 diabetes, chronic medical condition, controlled.  The patient is managed by endocrinology, Dr. Mas.  His most recent hemoglobin A1c and urine microalbumin creatinine ratio is not available for me to review.  -Continue current regimen of metformin 1000 mg twice daily, pioglitazone 30 mg daily, Farxiga 10 mg daily, and Ozempic 2 mg weekly.     With regard to the patient's hyperlipidemia, chronic medical condition, controlled.  The patient currently takes atorvastatin 40 mg nightly.  He denies statin associated myalgias.  Most recent lipid panel on 9/18/2023 showed a total cholesterol of 115, LDL 46, HDL 29, triglycerides 202.  -Continue current regimen of atorvastatin 40 mg nightly     Diabetic peripheral neuropathy (HCC)  Chronic condition, controlled.  The patient reports bilateral foot numbness and tingling.  At the patient's last " visit we increased his pregabalin to 150 mg twice daily.  Review of the patient's  shows that he was last prescribed pregabalin 150 mg, dispo #60, on 11/27/2023. Obtained and reviewed patient utilization report from [facility] pharmacy database on 12/5/2023 4:08 AM  prior to writing prescription for controlled substance II, III or IV per [place] bill . Based on assessment of the report, the prescription is medically necessary.   -Controlled substance treatment agreement and signed and scanned into the patient's chart on 12/5/2023  -Urine drug screen completed on 6/15/2022  -Continue current regimen of pregabalin 150 mg twice daily  - diclofenac DR (VOLTAREN) 75 MG Tablet Delayed Response; Take 1 Tablet by mouth 2 times a day.  Dispense: 60 Tablet; Refill: 2  - pregabalin (LYRICA) 150 MG Cap; Take 1 Capsule by mouth 2 times a day for 90 days.  Dispense: 180 Capsule; Refill: 0    Primary hypertension  Chronic condition, controlled.  The patient currently takes atenolol 50 mg daily and losartan 50 mg daily.  Blood pressure at today's visit is 132/66. He denies new headaches, chest pain, shortness of breath, or lower extremity edema.    - Continue current regimen of atenolol 50 mg daily and losartan 50 mg daily      Return in about 3 months (around 3/5/2024) for 3-month f/u visit.    Please note that this dictation was created using voice recognition software. I have made every reasonable attempt to correct obvious errors, but I expect that there are errors of grammar and possibly content that I did not discover before finalizing the note.

## 2023-12-05 ENCOUNTER — OFFICE VISIT (OUTPATIENT)
Dept: MEDICAL GROUP | Facility: PHYSICIAN GROUP | Age: 68
End: 2023-12-05
Payer: COMMERCIAL

## 2023-12-05 VITALS
DIASTOLIC BLOOD PRESSURE: 66 MMHG | RESPIRATION RATE: 18 BRPM | TEMPERATURE: 97.9 F | BODY MASS INDEX: 27.85 KG/M2 | OXYGEN SATURATION: 91 % | HEART RATE: 79 BPM | WEIGHT: 217 LBS | SYSTOLIC BLOOD PRESSURE: 132 MMHG | HEIGHT: 74 IN

## 2023-12-05 DIAGNOSIS — E78.5 HYPERLIPIDEMIA ASSOCIATED WITH TYPE 2 DIABETES MELLITUS (HCC): ICD-10-CM

## 2023-12-05 DIAGNOSIS — E11.69 HYPERLIPIDEMIA ASSOCIATED WITH TYPE 2 DIABETES MELLITUS (HCC): ICD-10-CM

## 2023-12-05 DIAGNOSIS — E11.42 DIABETIC PERIPHERAL NEUROPATHY (HCC): ICD-10-CM

## 2023-12-05 DIAGNOSIS — I10 PRIMARY HYPERTENSION: ICD-10-CM

## 2023-12-05 PROCEDURE — 3078F DIAST BP <80 MM HG: CPT | Performed by: INTERNAL MEDICINE

## 2023-12-05 PROCEDURE — 3075F SYST BP GE 130 - 139MM HG: CPT | Performed by: INTERNAL MEDICINE

## 2023-12-05 PROCEDURE — 99214 OFFICE O/P EST MOD 30 MIN: CPT | Performed by: INTERNAL MEDICINE

## 2023-12-05 RX ORDER — PREGABALIN 150 MG/1
150 CAPSULE ORAL 2 TIMES DAILY
Qty: 180 CAPSULE | Refills: 0 | Status: SHIPPED | OUTPATIENT
Start: 2023-12-22 | End: 2024-02-02

## 2023-12-05 RX ORDER — DICLOFENAC SODIUM 75 MG/1
75 TABLET, DELAYED RELEASE ORAL 2 TIMES DAILY
Qty: 60 TABLET | Refills: 2 | Status: SHIPPED | OUTPATIENT
Start: 2023-12-05

## 2023-12-05 RX ORDER — PREGABALIN 150 MG/1
150 CAPSULE ORAL 2 TIMES DAILY
Qty: 180 CAPSULE | Refills: 0 | Status: SHIPPED | OUTPATIENT
Start: 2023-12-05 | End: 2023-12-05

## 2023-12-13 ENCOUNTER — PHARMACY VISIT (OUTPATIENT)
Dept: PHARMACY | Facility: MEDICAL CENTER | Age: 68
End: 2023-12-13
Payer: COMMERCIAL

## 2023-12-13 PROCEDURE — RXMED WILLOW AMBULATORY MEDICATION CHARGE: Performed by: INTERNAL MEDICINE

## 2023-12-31 ENCOUNTER — OFFICE VISIT (OUTPATIENT)
Dept: URGENT CARE | Facility: PHYSICIAN GROUP | Age: 68
End: 2023-12-31
Payer: COMMERCIAL

## 2023-12-31 ENCOUNTER — HOSPITAL ENCOUNTER (OUTPATIENT)
Dept: RADIOLOGY | Facility: MEDICAL CENTER | Age: 68
End: 2023-12-31
Attending: FAMILY MEDICINE
Payer: COMMERCIAL

## 2023-12-31 VITALS
TEMPERATURE: 97.6 F | SYSTOLIC BLOOD PRESSURE: 136 MMHG | RESPIRATION RATE: 18 BRPM | OXYGEN SATURATION: 87 % | HEART RATE: 101 BPM | DIASTOLIC BLOOD PRESSURE: 78 MMHG | WEIGHT: 211.86 LBS | BODY MASS INDEX: 27.19 KG/M2 | HEIGHT: 74 IN

## 2023-12-31 DIAGNOSIS — R05.1 ACUTE COUGH: ICD-10-CM

## 2023-12-31 DIAGNOSIS — R06.02 SHORTNESS OF BREATH: ICD-10-CM

## 2023-12-31 DIAGNOSIS — J44.1 COPD EXACERBATION (HCC): ICD-10-CM

## 2023-12-31 DIAGNOSIS — B33.8 RSV INFECTION: ICD-10-CM

## 2023-12-31 LAB
FLUAV RNA SPEC QL NAA+PROBE: NEGATIVE
FLUBV RNA SPEC QL NAA+PROBE: NEGATIVE
RSV RNA SPEC QL NAA+PROBE: POSITIVE
SARS-COV-2 RNA RESP QL NAA+PROBE: NEGATIVE

## 2023-12-31 PROCEDURE — 94640 AIRWAY INHALATION TREATMENT: CPT | Performed by: FAMILY MEDICINE

## 2023-12-31 PROCEDURE — 3078F DIAST BP <80 MM HG: CPT | Performed by: FAMILY MEDICINE

## 2023-12-31 PROCEDURE — 71046 X-RAY EXAM CHEST 2 VIEWS: CPT

## 2023-12-31 PROCEDURE — 3075F SYST BP GE 130 - 139MM HG: CPT | Performed by: FAMILY MEDICINE

## 2023-12-31 PROCEDURE — 99214 OFFICE O/P EST MOD 30 MIN: CPT | Mod: 25 | Performed by: FAMILY MEDICINE

## 2023-12-31 PROCEDURE — 0241U POCT CEPHEID COV-2, FLU A/B, RSV - PCR: CPT | Performed by: FAMILY MEDICINE

## 2023-12-31 RX ORDER — PREDNISONE 20 MG/1
40 TABLET ORAL DAILY
Qty: 10 TABLET | Refills: 0 | Status: SHIPPED | OUTPATIENT
Start: 2023-12-31 | End: 2024-01-04

## 2023-12-31 RX ORDER — IPRATROPIUM BROMIDE AND ALBUTEROL 20; 100 UG/1; UG/1
1 SPRAY, METERED RESPIRATORY (INHALATION) 4 TIMES DAILY
Qty: 1 EACH | Refills: 0 | Status: SHIPPED | OUTPATIENT
Start: 2023-12-31

## 2023-12-31 RX ORDER — DOXYCYCLINE HYCLATE 100 MG
100 TABLET ORAL 2 TIMES DAILY
Qty: 10 TABLET | Refills: 0 | Status: SHIPPED | OUTPATIENT
Start: 2023-12-31 | End: 2024-01-04 | Stop reason: SDUPTHER

## 2023-12-31 RX ORDER — DEXTROMETHORPHAN HYDROBROMIDE AND PROMETHAZINE HYDROCHLORIDE 15; 6.25 MG/5ML; MG/5ML
5 SYRUP ORAL 4 TIMES DAILY PRN
Qty: 120 ML | Refills: 0 | Status: SHIPPED | OUTPATIENT
Start: 2023-12-31

## 2023-12-31 RX ORDER — DEXAMETHASONE SODIUM PHOSPHATE 10 MG/ML
10 INJECTION INTRAMUSCULAR; INTRAVENOUS ONCE
Status: COMPLETED | OUTPATIENT
Start: 2023-12-31 | End: 2023-12-31

## 2023-12-31 RX ORDER — IPRATROPIUM BROMIDE AND ALBUTEROL SULFATE 2.5; .5 MG/3ML; MG/3ML
3 SOLUTION RESPIRATORY (INHALATION) ONCE
Status: COMPLETED | OUTPATIENT
Start: 2023-12-31 | End: 2023-12-31

## 2023-12-31 RX ADMIN — DEXAMETHASONE SODIUM PHOSPHATE 10 MG: 10 INJECTION INTRAMUSCULAR; INTRAVENOUS at 11:27

## 2023-12-31 RX ADMIN — IPRATROPIUM BROMIDE AND ALBUTEROL SULFATE 3 ML: 2.5; .5 SOLUTION RESPIRATORY (INHALATION) at 11:30

## 2023-12-31 ASSESSMENT — ENCOUNTER SYMPTOMS
MYALGIAS: 0
VOMITING: 0
EYE REDNESS: 0
EYE DISCHARGE: 0
WEIGHT LOSS: 0
NAUSEA: 0

## 2023-12-31 NOTE — PROGRESS NOTES
"Subjective     Maximo Juan Ospina is a 68 y.o. male who presents with Other (Trouble breathing x3 days- cough, body aches, low back/neck and shoulder pain)            3 days productive cough without blood in sputum. Fever/chills. SOB/wheeze. No dx COPD but approx 50 pyh. +PMH pneumonia. No other aggravating or alleviating factors.          Review of Systems   Constitutional:  Negative for malaise/fatigue and weight loss.   Eyes:  Negative for discharge and redness.   Gastrointestinal:  Negative for nausea and vomiting.   Musculoskeletal:  Negative for joint pain and myalgias.   Skin:  Negative for itching and rash.              Objective     /78 (BP Location: Right arm, Patient Position: Sitting, BP Cuff Size: Adult)   Pulse (!) 101   Temp 36.4 °C (97.6 °F) (Temporal)   Resp 18   Ht 1.88 m (6' 2.02\")   Wt 96.1 kg (211 lb 13.8 oz)   SpO2 (!) 87%   BMI 27.19 kg/m²      Physical Exam  Constitutional:       Appearance: Normal appearance.   HENT:      Head: Normocephalic and atraumatic.   Eyes:      Conjunctiva/sclera: Conjunctivae normal.   Cardiovascular:      Rate and Rhythm: Normal rate and regular rhythm.      Heart sounds: Normal heart sounds.   Musculoskeletal:      Cervical back: Neck supple.   Lymphadenopathy:      Cervical: No cervical adenopathy.   Neurological:      Mental Status: He is alert.                             Assessment & Plan      Chest x-ray per radiology  1.  No acute cardiopulmonary abnormality identified.     2.  Hyperinflation consistent with chronic obstructive pulmonary disease     3.  Enlarged cardiac silhouette and prior sternotomy    1. Acute cough  DX-CHEST-2 VIEWS    POCT CEPHEID COV-2, FLU A/B, RSV - PCR    promethazine-dextromethorphan (PROMETHAZINE-DM) 6.25-15 MG/5ML syrup      2. Shortness of breath  dexamethasone (Decadron) injection (check route below) 10 mg    ipratropium-albuterol (DUONEB) nebulizer solution    DX-CHEST-2 VIEWS      3. COPD exacerbation " (HCC)  ipratropium-albuterol (COMBIVENT RESPIMAT)  MCG/ACT Aero Soln    predniSONE (DELTASONE) 20 MG Tab    doxycycline (VIBRAMYCIN) 100 MG Tab        Differential diagnosis, natural history, supportive care, and indications for immediate follow-up were discussed.     F/u pcp

## 2024-01-04 ENCOUNTER — OFFICE VISIT (OUTPATIENT)
Dept: MEDICAL GROUP | Facility: PHYSICIAN GROUP | Age: 69
End: 2024-01-04
Payer: COMMERCIAL

## 2024-01-04 VITALS
HEIGHT: 74 IN | SYSTOLIC BLOOD PRESSURE: 130 MMHG | TEMPERATURE: 97.6 F | WEIGHT: 211 LBS | HEART RATE: 96 BPM | BODY MASS INDEX: 27.08 KG/M2 | RESPIRATION RATE: 18 BRPM | DIASTOLIC BLOOD PRESSURE: 72 MMHG | OXYGEN SATURATION: 92 %

## 2024-01-04 DIAGNOSIS — J44.1 COPD EXACERBATION (HCC): ICD-10-CM

## 2024-01-04 DIAGNOSIS — J20.9 ACUTE BRONCHITIS, UNSPECIFIED ORGANISM: ICD-10-CM

## 2024-01-04 PROCEDURE — 3078F DIAST BP <80 MM HG: CPT | Performed by: NURSE PRACTITIONER

## 2024-01-04 PROCEDURE — 99214 OFFICE O/P EST MOD 30 MIN: CPT | Performed by: NURSE PRACTITIONER

## 2024-01-04 PROCEDURE — 3075F SYST BP GE 130 - 139MM HG: CPT | Performed by: NURSE PRACTITIONER

## 2024-01-04 RX ORDER — METHYLPREDNISOLONE 4 MG/1
TABLET ORAL
Qty: 21 TABLET | Refills: 0 | Status: SHIPPED | OUTPATIENT
Start: 2024-01-04

## 2024-01-04 RX ORDER — DOXYCYCLINE HYCLATE 100 MG
100 TABLET ORAL 2 TIMES DAILY
Qty: 10 TABLET | Refills: 0 | Status: SHIPPED | OUTPATIENT
Start: 2024-01-04 | End: 2024-01-09

## 2024-01-04 RX ORDER — FLUTICASONE PROPIONATE AND SALMETEROL 100; 50 UG/1; UG/1
1 POWDER RESPIRATORY (INHALATION) EVERY 12 HOURS
Qty: 60 EACH | Refills: 0 | Status: SHIPPED | OUTPATIENT
Start: 2024-01-04

## 2024-01-04 NOTE — PROGRESS NOTES
Chief Complaint   Patient presents with    Cough     Seen x1wk ago in , diagnosed with RSV. States he is not getting any better.       HISTORY OF PRESENT ILLNESS: Raheem Ospina is a 68 y.o. male established patient of Dr Torres who presents today to discuss:  - symptoms of productive cough, body aches, exertional dyspnea started on 12/28/2023. He was seen in  on 12/31/2023, tested positive for RSV; cxray noted no acute cardiopulmonary abnormality; there was hyperinflation consistent with chronic obstructive pulmonary disease, enlarged cardiac silhouette and prior sternotomy. He was started on BID doxycycline, 5 day course of prednisone 40mg. States cough and dyspnea has improved some but still present. Has wheezing at night. Has been using prescribed combivent every 4 hours; he does have old prescription for Advair from past exacerbation with COVID infection in early 2023 but has not used this inhaler yet.   - hx KARLEY, lost weight and weaned off opiates; has not needed CPAP in several years since weight loss  - daily cigarette smoker +47 yrs, has cut down to 4 cigarettes/day.      Current Outpatient Medications on File Prior to Visit   Medication Sig Dispense Refill    ipratropium-albuterol (COMBIVENT RESPIMAT)  MCG/ACT Aero Soln Inhale 1 Puff 4 times a day. 1 Each 0    promethazine-dextromethorphan (PROMETHAZINE-DM) 6.25-15 MG/5ML syrup Take 5 mL by mouth 4 times a day as needed for Cough. 120 mL 0    diclofenac DR (VOLTAREN) 75 MG Tablet Delayed Response Take 1 Tablet by mouth 2 times a day. 60 Tablet 2    pregabalin (LYRICA) 150 MG Cap Take 1 Capsule by mouth 2 times a day for 90 days. 180 Capsule 0    albuterol 108 (90 Base) MCG/ACT Aero Soln inhalation aerosol Inhale 2 Puffs every 6 hours as needed for Shortness of Breath. 8.5 g 0    OZEMPIC, 2 MG/DOSE, 8 MG/3ML Solution Pen-injector       testosterone cypionate (DEPO-TESTOSTERONE) 200 MG/ML Solution injection Inject 150 mg (0.75 ml )  "intramuscularly every 14 days for 28 days 2 mL 5    losartan (COZAAR) 50 MG Tab TAKE ONE TABLET BY MOUTH DAILY 90 tablet 1    metFORMIN ER (GLUCOPHAGE XR) 500 MG TABLET SR 24 HR TAKE TWO TABLETS BY MOUTH TWICE A  tablet 1    pioglitazone (ACTOS) 30 MG Tab TAKE ONE TABLET BY MOUTH DAILY 90 tablet 0    atenolol (TENORMIN) 50 MG Tab TAKE 1 TAB BY MOUTH EVERY DAY. 90 Tab 2    atorvastatin (LIPITOR) 40 MG Tab TAKE ONE TABLET BY MOUTH DAILY 90 Tab 2    levothyroxine (SYNTHROID) 75 MCG Tab Take 1 Tab by mouth Every morning on an empty stomach. 90 Tab 2    Cholecalciferol (VITAMIN D) 50 MCG (2000 UT) Cap Take  by mouth.      Omega-3 Fatty Acids (OMEGA-3 FISH OIL) 1200 MG Cap Take  by mouth.      Dapagliflozin Propanediol (FARXIGA) 10 MG Tab Take 1 tablet by mouth every day. 90 Tab 3    aspirin EC (ECOTRIN) 325 MG Tablet Delayed Response Take 325 mg by mouth every day.      sildenafil citrate (VIAGRA) 100 MG tablet Take 100 mg by mouth as needed.      Coenzyme Q10 (CO Q 10 PO) Take  by mouth.       No current facility-administered medications on file prior to visit.       has a past medical history of Cardiovascular disease, Coronary artery disease, Diabetes (HCC), Diabetic neuropathy (HCC), Hyperlipidemia, Hypertension, and Thyroid disease.     Patient Active Problem List   Diagnosis    Hyperlipidemia associated with type 2 diabetes mellitus (HCC)    Acquired hypothyroidism    Hypogonadism in male    Primary hypertension    Diabetic peripheral neuropathy (HCC)    Elevated hemoglobin (HCC)    HAYLEY (generalized anxiety disorder)    CAD (coronary artery disease)    BPPV (benign paroxysmal positional vertigo)    Chronic obstructive pulmonary disease with acute exacerbation (HCC)        Allergies:Benicar [olmesartan]    Health Maintenance: deferred  Review of Systems -included above  Exam:   /72   Pulse 96   Temp 36.4 °C (97.6 °F)   Resp 18   Ht 1.88 m (6' 2\")   Wt 95.7 kg (211 lb)   SpO2 92%   Body mass index " is 27.09 kg/m².   Physical Exam  Constitutional:       Appearance: Normal appearance.   Cardiovascular:      Rate and Rhythm: Normal rate and regular rhythm.   Pulmonary:      Breath sounds: Decreased air movement present. Rhonchi present.   Musculoskeletal:      Right lower leg: No edema.      Left lower leg: No edema.   Skin:     General: Skin is warm and dry.   Neurological:      Mental Status: He is alert.         Assessment/Plan:  1. COPD exacerbation (HCC)  2. Acute bronchitis, unspecified organism  67 y/o M with hx COPD, 47+ yrs smoking, hypothyroid, hyperlipidemia, diabetes mellitus type 2, hypertension presenting with productive cough, body aches, exertional dyspnea x1 week; diagnosed RSV on 12/31/2023; some improvement with doxycycline & 5 day course of prednisone 40mg; BS diminished today with scattered rhonchi, will extend abx for 5 more days & extend steroid with tapering medrol pack. Start back LABA-ICS with Advair-100 BID, refill sent today. Can continue with prn combivent, mucinex, nasal irrigation for congestion. Recommend PFT to assess lung function and follow up LDCT to assess for lung nodules. Patient declined further testing at this time, he would like to discuss with his PCP/Dr Brian cleary. We assisted him in setting up follow up visit in 2 weeks. He understands to report to ER if respiratory or cardiac symptoms worsen and become life threatening.      - fluticasone-salmeterol (ADVAIR) 100-50 MCG/ACT AEROSOL POWDER, BREATH ACTIVATED; Inhale 1 Puff every 12 hours.  Dispense: 60 Each; Refill: 0  - doxycycline (VIBRAMYCIN) 100 MG Tab; Take 1 Tablet by mouth 2 times a day for 5 days.  Dispense: 10 Tablet; Refill: 0  - methylPREDNISolone (MEDROL DOSEPAK) 4 MG Tablet Therapy Pack; As directed on the packaging label.  Dispense: 21 Tablet; Refill: 0         Follow up:  Return in about 2 weeks (around 1/18/2024) for COPD, cough follow up.    Educated in proper administration of medication(s) ordered  today including safety, possible SE, risks, benefits, rationale and alternatives to therapy.       Please note that this dictation was created using voice recognition software. I have made every reasonable attempt to correct obvious errors, but I expect that there are errors of grammar and possibly content that I did not discover before finalizing the note.

## 2024-01-12 ENCOUNTER — PHARMACY VISIT (OUTPATIENT)
Dept: PHARMACY | Facility: MEDICAL CENTER | Age: 69
End: 2024-01-12
Payer: COMMERCIAL

## 2024-01-12 PROCEDURE — RXMED WILLOW AMBULATORY MEDICATION CHARGE: Performed by: INTERNAL MEDICINE

## 2024-01-24 ENCOUNTER — PHARMACY VISIT (OUTPATIENT)
Dept: PHARMACY | Facility: MEDICAL CENTER | Age: 69
End: 2024-01-24
Payer: COMMERCIAL

## 2024-01-24 PROCEDURE — RXMED WILLOW AMBULATORY MEDICATION CHARGE: Performed by: INTERNAL MEDICINE

## 2024-02-01 DIAGNOSIS — E11.42 DIABETIC PERIPHERAL NEUROPATHY (HCC): ICD-10-CM

## 2024-02-01 NOTE — TELEPHONE ENCOUNTER
Received request via: Pharmacy    Was the patient seen in the last year in this department? Yes    Does the patient have an active prescription (recently filled or refills available) for medication(s) requested? No    Pharmacy Name: [facility]    Does the patient have alf Plus and need 100 day supply (blood pressure, diabetes and cholesterol meds only)? Patient does not have SCP

## 2024-02-02 RX ORDER — PREGABALIN 150 MG/1
150 CAPSULE ORAL 2 TIMES DAILY
Qty: 180 CAPSULE | Refills: 0 | Status: SHIPPED | OUTPATIENT
Start: 2024-02-02 | End: 2024-05-02

## 2024-02-07 ENCOUNTER — PHARMACY VISIT (OUTPATIENT)
Dept: PHARMACY | Facility: MEDICAL CENTER | Age: 69
End: 2024-02-07
Payer: COMMERCIAL

## 2024-02-07 PROCEDURE — RXMED WILLOW AMBULATORY MEDICATION CHARGE: Performed by: INTERNAL MEDICINE

## 2024-02-21 ENCOUNTER — PHARMACY VISIT (OUTPATIENT)
Dept: PHARMACY | Facility: MEDICAL CENTER | Age: 69
End: 2024-02-21
Payer: COMMERCIAL

## 2024-02-21 PROCEDURE — RXMED WILLOW AMBULATORY MEDICATION CHARGE: Performed by: INTERNAL MEDICINE

## 2024-03-04 ENCOUNTER — HOSPITAL ENCOUNTER (OUTPATIENT)
Dept: LAB | Facility: MEDICAL CENTER | Age: 69
End: 2024-03-04
Attending: INTERNAL MEDICINE
Payer: COMMERCIAL

## 2024-03-04 LAB
25(OH)D3 SERPL-MCNC: 37 NG/ML (ref 30–100)
ANION GAP SERPL CALC-SCNC: 12 MMOL/L (ref 7–16)
BUN SERPL-MCNC: 19 MG/DL (ref 8–22)
CALCIUM SERPL-MCNC: 9.7 MG/DL (ref 8.5–10.5)
CHLORIDE SERPL-SCNC: 100 MMOL/L (ref 96–112)
CHOLEST SERPL-MCNC: 140 MG/DL (ref 100–199)
CO2 SERPL-SCNC: 24 MMOL/L (ref 20–33)
CREAT SERPL-MCNC: 1.15 MG/DL (ref 0.5–1.4)
CREAT UR-MCNC: 67.25 MG/DL
ERYTHROCYTE [DISTWIDTH] IN BLOOD BY AUTOMATED COUNT: 58.6 FL (ref 35.9–50)
FASTING STATUS PATIENT QL REPORTED: NORMAL
GFR SERPLBLD CREATININE-BSD FMLA CKD-EPI: 69 ML/MIN/1.73 M 2
GLUCOSE SERPL-MCNC: 156 MG/DL (ref 65–99)
HCT VFR BLD AUTO: 55.2 % (ref 42–52)
HDLC SERPL-MCNC: 30 MG/DL
HGB BLD-MCNC: 17.1 G/DL (ref 14–18)
LDLC SERPL CALC-MCNC: 68 MG/DL
MCH RBC QN AUTO: 24.2 PG (ref 27–33)
MCHC RBC AUTO-ENTMCNC: 31 G/DL (ref 32.3–36.5)
MCV RBC AUTO: 78 FL (ref 81.4–97.8)
MICROALBUMIN UR-MCNC: 83.7 MG/DL
MICROALBUMIN/CREAT UR: 1245 MG/G (ref 0–30)
PLATELET # BLD AUTO: 272 K/UL (ref 164–446)
PMV BLD AUTO: 10.3 FL (ref 9–12.9)
POTASSIUM SERPL-SCNC: 5 MMOL/L (ref 3.6–5.5)
RBC # BLD AUTO: 7.08 M/UL (ref 4.7–6.1)
SODIUM SERPL-SCNC: 136 MMOL/L (ref 135–145)
T3FREE SERPL-MCNC: 3.32 PG/ML (ref 2–4.4)
T4 FREE SERPL-MCNC: 1.49 NG/DL (ref 0.93–1.7)
TESTOST SERPL-MCNC: 505 NG/DL (ref 175–781)
TRIGL SERPL-MCNC: 212 MG/DL (ref 0–149)
TSH SERPL DL<=0.005 MIU/L-ACNC: 1.38 UIU/ML (ref 0.38–5.33)
VIT B12 SERPL-MCNC: >4000 PG/ML (ref 211–911)
WBC # BLD AUTO: 10.6 K/UL (ref 4.8–10.8)

## 2024-03-04 PROCEDURE — 82306 VITAMIN D 25 HYDROXY: CPT

## 2024-03-04 PROCEDURE — 84270 ASSAY OF SEX HORMONE GLOBUL: CPT

## 2024-03-04 PROCEDURE — 36415 COLL VENOUS BLD VENIPUNCTURE: CPT

## 2024-03-04 PROCEDURE — 84443 ASSAY THYROID STIM HORMONE: CPT

## 2024-03-04 PROCEDURE — 82570 ASSAY OF URINE CREATININE: CPT

## 2024-03-04 PROCEDURE — 84481 FREE ASSAY (FT-3): CPT

## 2024-03-04 PROCEDURE — 84439 ASSAY OF FREE THYROXINE: CPT

## 2024-03-04 PROCEDURE — 82043 UR ALBUMIN QUANTITATIVE: CPT

## 2024-03-04 PROCEDURE — 80061 LIPID PANEL: CPT

## 2024-03-04 PROCEDURE — 82607 VITAMIN B-12: CPT

## 2024-03-04 PROCEDURE — 84403 ASSAY OF TOTAL TESTOSTERONE: CPT

## 2024-03-04 PROCEDURE — 85027 COMPLETE CBC AUTOMATED: CPT

## 2024-03-04 PROCEDURE — 80048 BASIC METABOLIC PNL TOTAL CA: CPT

## 2024-03-05 ENCOUNTER — APPOINTMENT (OUTPATIENT)
Dept: MEDICAL GROUP | Facility: PHYSICIAN GROUP | Age: 69
End: 2024-03-05
Payer: COMMERCIAL

## 2024-03-06 LAB — SHBG SERPL-SCNC: 42 NMOL/L (ref 19–76)

## 2024-03-13 PROCEDURE — RXMED WILLOW AMBULATORY MEDICATION CHARGE: Performed by: INTERNAL MEDICINE

## 2024-03-20 ENCOUNTER — PHARMACY VISIT (OUTPATIENT)
Dept: PHARMACY | Facility: MEDICAL CENTER | Age: 69
End: 2024-03-20
Payer: COMMERCIAL

## 2024-04-15 PROCEDURE — RXMED WILLOW AMBULATORY MEDICATION CHARGE: Performed by: INTERNAL MEDICINE

## 2024-04-17 ENCOUNTER — PHARMACY VISIT (OUTPATIENT)
Dept: PHARMACY | Facility: MEDICAL CENTER | Age: 69
End: 2024-04-17
Payer: COMMERCIAL

## 2024-04-29 DIAGNOSIS — E11.42 DIABETIC PERIPHERAL NEUROPATHY (HCC): ICD-10-CM

## 2024-04-30 NOTE — TELEPHONE ENCOUNTER
Received request via: Pharmacy    Was the patient seen in the last year in this department? Yes    Does the patient have an active prescription (recently filled or refills available) for medication(s) requested? No    Pharmacy Name: [facility]     Does the patient have long term Plus and need 100 day supply (blood pressure, diabetes and cholesterol meds only)? Patient does not have SCP

## 2024-05-04 RX ORDER — PREGABALIN 150 MG/1
150 CAPSULE ORAL 2 TIMES DAILY
Qty: 180 CAPSULE | Refills: 1 | Status: SHIPPED | OUTPATIENT
Start: 2024-05-04 | End: 2024-05-21 | Stop reason: SDUPTHER

## 2024-05-15 ENCOUNTER — PHARMACY VISIT (OUTPATIENT)
Dept: PHARMACY | Facility: MEDICAL CENTER | Age: 69
End: 2024-05-15
Payer: COMMERCIAL

## 2024-05-15 PROCEDURE — RXMED WILLOW AMBULATORY MEDICATION CHARGE: Performed by: INTERNAL MEDICINE

## 2024-05-18 NOTE — PROGRESS NOTES
Subjective:     CC:   Chief Complaint   Patient presents with    Other     Wants to know if he can get RSV shot since he was sick in December and does not want to go through that     Back Pain     Wants it noted in his chart since he has had this issue since he was 25. Wants to know if he can stop pregabalin (LYRICA) 150 MG Cap and get tramadol or even alternate them          HPI:   Raheem Ospina is a 68-year-old male who presents for follow-up visit.  The patient reports that he was very ill with RSV and plans to get his vaccination.  He reports that he has had chronic back pain since the age of 25 and has had several surgical interventions for this condition prior to relocating to [place].  He has been taking pregabalin for both chronic back pain and peripheral neuropathy.  He recently has started taking diclofenac 75 mg twice daily as well.  He recently took a tramadol and reported much greater pain improvement in his back than what he has been getting with the pregabalin and wonders if he could switch to tramadol or alternate the pregabalin and tramadol.  He is currently receiving chiropractic manipulation for his back pain.        Past Medical History:   Diagnosis Date    Cardiovascular disease     Coronary artery disease     Diabetes (HCC)     Diabetic neuropathy (HCC)     Hyperlipidemia     Hypertension     Thyroid disease        Social History     Tobacco Use    Smoking status: Every Day     Current packs/day: 0.75     Average packs/day: 0.8 packs/day for 47.0 years (35.3 ttl pk-yrs)     Types: Cigarettes    Smokeless tobacco: Never   Vaping Use    Vaping status: Never Used   Substance Use Topics    Alcohol use: Not Currently    Drug use: Yes     Types: Marijuana     Comment: pt uses THC 5mg tablets everning to sleep        Current Outpatient Medications Ordered in Epic   Medication Sig Dispense Refill    pregabalin (LYRICA) 150 MG Cap Take 1 Capsule by mouth 2 times a day for 180 days. 180  Capsule 1    testosterone cypionate (DEPO-TESTOSTERONE) 200 MG/ML injection INJECT 200 mg( 1 ml) intramuscular every 14 days 150 days 6 mL 0    fluticasone-salmeterol (ADVAIR) 100-50 MCG/ACT AEROSOL POWDER, BREATH ACTIVATED Inhale 1 Puff every 12 hours. 60 Each 0    methylPREDNISolone (MEDROL DOSEPAK) 4 MG Tablet Therapy Pack As directed on the packaging label. 21 Tablet 0    ipratropium-albuterol (COMBIVENT RESPIMAT)  MCG/ACT Aero Soln Inhale 1 Puff 4 times a day. 1 Each 0    promethazine-dextromethorphan (PROMETHAZINE-DM) 6.25-15 MG/5ML syrup Take 5 mL by mouth 4 times a day as needed for Cough. 120 mL 0    diclofenac DR (VOLTAREN) 75 MG Tablet Delayed Response Take 1 Tablet by mouth 2 times a day. 60 Tablet 2    albuterol 108 (90 Base) MCG/ACT Aero Soln inhalation aerosol Inhale 2 Puffs every 6 hours as needed for Shortness of Breath. 8.5 g 0    OZEMPIC, 2 MG/DOSE, 8 MG/3ML Solution Pen-injector       testosterone cypionate (DEPO-TESTOSTERONE) 200 MG/ML Solution injection Inject 150 mg (0.75 ml ) intramuscularly every 14 days for 28 days 2 mL 5    losartan (COZAAR) 50 MG Tab TAKE ONE TABLET BY MOUTH DAILY 90 tablet 1    metFORMIN ER (GLUCOPHAGE XR) 500 MG TABLET SR 24 HR TAKE TWO TABLETS BY MOUTH TWICE A  tablet 1    pioglitazone (ACTOS) 30 MG Tab TAKE ONE TABLET BY MOUTH DAILY 90 tablet 0    atenolol (TENORMIN) 50 MG Tab TAKE 1 TAB BY MOUTH EVERY DAY. 90 Tab 2    atorvastatin (LIPITOR) 40 MG Tab TAKE ONE TABLET BY MOUTH DAILY 90 Tab 2    levothyroxine (SYNTHROID) 75 MCG Tab Take 1 Tab by mouth Every morning on an empty stomach. 90 Tab 2    Cholecalciferol (VITAMIN D) 50 MCG (2000 UT) Cap Take  by mouth.      Omega-3 Fatty Acids (OMEGA-3 FISH OIL) 1200 MG Cap Take  by mouth.      Dapagliflozin Propanediol (FARXIGA) 10 MG Tab Take 1 tablet by mouth every day. 90 Tab 3    aspirin EC (ECOTRIN) 325 MG Tablet Delayed Response Take 325 mg by mouth every day.      sildenafil citrate (VIAGRA) 100 MG tablet  "Take 100 mg by mouth as needed.      Coenzyme Q10 (CO Q 10 PO) Take  by mouth.       No current Epic-ordered facility-administered medications on file.       Allergies:  Benicar [olmesartan]    Health Maintenance: Completed    Review of Systems:  No fevers or chills. No cough, chest pain, or shortness of breath.       Objective:       Exam:  /72   Pulse 84   Temp 37.1 °C (98.8 °F) (Temporal)   Ht 1.88 m (6' 2\")   Wt 97.1 kg (214 lb)   SpO2 95%   BMI 27.48 kg/m²  Body mass index is 27.48 kg/m².    Gen: Alert and oriented, No apparent distress.  Lungs: Normal effort, CTA bilaterally, no wheezes, rhonchi, or rales  CV: Regular rate and rhythm. No murmurs, rubs, or gallops.  Ext: No clubbing, cyanosis, edema.        Assessment & Plan:     68 y.o. male with the following -     Hyperlipidemia associated with type 2 diabetes mellitus (HCC)  With regard to the patient's type 2 diabetes, chronic medical condition, controlled.  The patient is managed by endocrinology, Dr. Mas.  Urine microalbumin creatinine ratio on 3/4/2024 was elevated at 1245.  No hemoglobin A1c available for review as it is done in the office of his endocrinologist.    -Continue current regimen of metformin 1000 mg twice daily, pioglitazone 30 mg daily, Farxiga 10 mg daily, and Ozempic 2 mg weekly.  - Comp Metabolic Panel; Future  - HEMOGLOBIN A1C; Future  - MICROALBUMIN CREAT RATIO URINE; Future     With regard to the patient's hyperlipidemia, chronic medical condition, controlled.  The patient currently takes atorvastatin 40 mg nightly.  He denies statin associated myalgias.  Lipid panel on 3/4/2024 showed a total cholesterol 140, LDL 68, HDL 30, triglycerides 212.  -Continue current regimen of atorvastatin 40 mg nightly  - Comp Metabolic Panel; Future  - Lipid Profile; Future     Diabetic peripheral neuropathy (HCC)  Chronic condition, controlled.  The patient reports longstanding bilateral foot numbness and tingling.  He currently " takes pregabalin 150 mg twice daily.  Review of the patient's  shows that he was last prescribed pregabalin 150 mg, dispo #60, on 4/29/2024. Obtained and reviewed patient utilization report from [facility] pharmacy database on 5/21/2024 4:32 AM  prior to writing prescription for controlled substance II, III or IV per [place] bill . Based on assessment of the report, the prescription is medically necessary.   -Controlled substance treatment agreement and signed and scanned into the patient's chart on 5/21/2024  -Urine drug screen obtained on 5/21/2024  -Continue current regimen of pregabalin 150 mg twice daily  - pregabalin (LYRICA) 150 MG Cap; Take 1 Capsule by mouth 2 times a day for 90 days.  Dispense: 180 Capsule; Refill: 0  - Controlled Substance Treatment Agreement     Primary hypertension  Chronic condition, controlled.  The patient currently takes atenolol 50 mg daily and losartan 50 mg daily.  Blood pressure at today's visit is 126/72. He denies new headaches, chest pain, shortness of breath, or lower extremity edema.    - Continue current regimen of atenolol 50 mg daily and losartan 50 mg daily    Acquired hypothyroidism  Chronic medical condition.  Ongoing and well controlled.  The patient is managed by endocrinology, Dr. Mas.  Labs from 3/4/2024 showed a TSH of 1.38 and a free T4 of 1.49.  The patient is clinically euthyroid.  -Continue current regimen of levothyroxine 75 mcg daily.  - TSH; Future  - FREE THYROXINE; Future     Hypogonadism in male  Elevated hemoglobin (HCC)  Low mean corpuscular volume (MCV)  Chronic condition, controlled.  The patient is managed by endocrinology, Dr. Mas.  Testosterone level on 3/4/2024 was 505.  PSA from 3/23/2022 was normal at 2.4.  He is due for an updated PSA.  The patient also has elevated hemoglobin secondary to chronic TRT.  He undergoes phlebotomy every 3 months, managed by endocrinology.  Hemoglobin and hematocrit from 3/4/2024 were 17.1 and  55.2.  MCV was reduced at 78.  As his MCV is now reduced we will check iron studies, likely low from repeated phlebotomy.  -Continue current regimen of testosterone 150 mg every 2 weeks, managed by endocrinology.  - FERRITIN; Future  - IRON/TOTAL IRON BIND; Future  - TESTOSTERONE BIO/SHBG MALE; Future  - CBC WITH DIFFERENTIAL; Future    Chronic bilateral low back pain with left-sided sciatica  Chronic condition, stable.  The patient reports chronic lower back pain since the age 25.  He has received he is currently undergoing chiropractic manipulation.  He recently tried a tramadol for his back pain and felt that it was more effective than the pregabalin.  He wonders if he can switch medications or alternate them.  Advised the patient that he is taking pregabalin for both peripheral neuropathy and his back pain and that tramadol will not be very effective for his peripheral neuropathy.  I feel he will benefit most from referral to pain management at this point given his increased need for pain control with more than 1 condition needing treatment.  - Referral to Pain Management    Encounter for abdominal aortic aneurysm (AAA) screening  - US-ABDOMINAL AORTA SCREENING (AAA); Future    Prostate cancer screening  - PROSTATE SPECIFIC AG SCREENING; Future    Colon cancer screening  - COLOGUARD COLON CANCER SCREENING      Return in about 3 months (around 8/21/2024) for Controlled Substance.    Please note that this dictation was created using voice recognition software. I have made every reasonable attempt to correct obvious errors, but I expect that there are errors of grammar and possibly content that I did not discover before finalizing the note.

## 2024-05-21 ENCOUNTER — OFFICE VISIT (OUTPATIENT)
Dept: MEDICAL GROUP | Facility: PHYSICIAN GROUP | Age: 69
End: 2024-05-21
Payer: COMMERCIAL

## 2024-05-21 VITALS
DIASTOLIC BLOOD PRESSURE: 72 MMHG | OXYGEN SATURATION: 95 % | WEIGHT: 214 LBS | HEART RATE: 84 BPM | HEIGHT: 74 IN | SYSTOLIC BLOOD PRESSURE: 126 MMHG | BODY MASS INDEX: 27.46 KG/M2 | TEMPERATURE: 98.8 F

## 2024-05-21 DIAGNOSIS — D58.2 ELEVATED HEMOGLOBIN (HCC): ICD-10-CM

## 2024-05-21 DIAGNOSIS — Z12.5 PROSTATE CANCER SCREENING: ICD-10-CM

## 2024-05-21 DIAGNOSIS — I10 PRIMARY HYPERTENSION: ICD-10-CM

## 2024-05-21 DIAGNOSIS — E11.69 HYPERLIPIDEMIA ASSOCIATED WITH TYPE 2 DIABETES MELLITUS (HCC): ICD-10-CM

## 2024-05-21 DIAGNOSIS — E11.42 DIABETIC PERIPHERAL NEUROPATHY (HCC): ICD-10-CM

## 2024-05-21 DIAGNOSIS — R71.8 LOW MEAN CORPUSCULAR VOLUME (MCV): ICD-10-CM

## 2024-05-21 DIAGNOSIS — Z13.6 ENCOUNTER FOR ABDOMINAL AORTIC ANEURYSM (AAA) SCREENING: ICD-10-CM

## 2024-05-21 DIAGNOSIS — M54.42 CHRONIC BILATERAL LOW BACK PAIN WITH LEFT-SIDED SCIATICA: ICD-10-CM

## 2024-05-21 DIAGNOSIS — E78.5 HYPERLIPIDEMIA ASSOCIATED WITH TYPE 2 DIABETES MELLITUS (HCC): ICD-10-CM

## 2024-05-21 DIAGNOSIS — Z12.11 COLON CANCER SCREENING: ICD-10-CM

## 2024-05-21 DIAGNOSIS — E29.1 HYPOGONADISM IN MALE: ICD-10-CM

## 2024-05-21 DIAGNOSIS — G89.29 CHRONIC BILATERAL LOW BACK PAIN WITH LEFT-SIDED SCIATICA: ICD-10-CM

## 2024-05-21 DIAGNOSIS — E03.9 ACQUIRED HYPOTHYROIDISM: ICD-10-CM

## 2024-05-21 PROBLEM — J44.9 COPD (CHRONIC OBSTRUCTIVE PULMONARY DISEASE) (HCC): Status: ACTIVE | Noted: 2024-01-04

## 2024-05-21 PROCEDURE — 99214 OFFICE O/P EST MOD 30 MIN: CPT | Performed by: INTERNAL MEDICINE

## 2024-05-21 PROCEDURE — 3078F DIAST BP <80 MM HG: CPT | Performed by: INTERNAL MEDICINE

## 2024-05-21 PROCEDURE — 3074F SYST BP LT 130 MM HG: CPT | Performed by: INTERNAL MEDICINE

## 2024-05-21 RX ORDER — PREGABALIN 150 MG/1
150 CAPSULE ORAL 2 TIMES DAILY
Qty: 180 CAPSULE | Refills: 1 | Status: SHIPPED | OUTPATIENT
Start: 2024-05-21 | End: 2024-11-17

## 2024-06-07 PROCEDURE — RXMED WILLOW AMBULATORY MEDICATION CHARGE: Performed by: INTERNAL MEDICINE

## 2024-06-12 ENCOUNTER — PHARMACY VISIT (OUTPATIENT)
Dept: PHARMACY | Facility: MEDICAL CENTER | Age: 69
End: 2024-06-12
Payer: COMMERCIAL

## 2024-06-28 ENCOUNTER — HOSPITAL ENCOUNTER (OUTPATIENT)
Dept: RADIOLOGY | Facility: MEDICAL CENTER | Age: 69
End: 2024-06-28
Attending: INTERNAL MEDICINE
Payer: COMMERCIAL

## 2024-06-28 DIAGNOSIS — Z13.6 ENCOUNTER FOR ABDOMINAL AORTIC ANEURYSM (AAA) SCREENING: ICD-10-CM

## 2024-06-28 PROCEDURE — 76706 US ABDL AORTA SCREEN AAA: CPT

## 2024-07-02 ENCOUNTER — HOSPITAL ENCOUNTER (OUTPATIENT)
Dept: LAB | Facility: MEDICAL CENTER | Age: 69
End: 2024-07-02
Attending: INTERNAL MEDICINE
Payer: COMMERCIAL

## 2024-07-02 DIAGNOSIS — D58.2 ELEVATED HEMOGLOBIN (HCC): ICD-10-CM

## 2024-07-02 DIAGNOSIS — E11.69 HYPERLIPIDEMIA ASSOCIATED WITH TYPE 2 DIABETES MELLITUS (HCC): ICD-10-CM

## 2024-07-02 DIAGNOSIS — R71.8 LOW MEAN CORPUSCULAR VOLUME (MCV): ICD-10-CM

## 2024-07-02 DIAGNOSIS — E29.1 HYPOGONADISM IN MALE: ICD-10-CM

## 2024-07-02 DIAGNOSIS — E78.5 HYPERLIPIDEMIA ASSOCIATED WITH TYPE 2 DIABETES MELLITUS (HCC): ICD-10-CM

## 2024-07-02 DIAGNOSIS — Z12.5 PROSTATE CANCER SCREENING: ICD-10-CM

## 2024-07-02 LAB
25(OH)D3 SERPL-MCNC: 34 NG/ML (ref 30–100)
ALBUMIN SERPL BCP-MCNC: 3.9 G/DL (ref 3.2–4.9)
ALBUMIN/GLOB SERPL: 0.9 G/DL
ALP SERPL-CCNC: 95 U/L (ref 30–99)
ALT SERPL-CCNC: 9 U/L (ref 2–50)
ANION GAP SERPL CALC-SCNC: 14 MMOL/L (ref 7–16)
ANISOCYTOSIS BLD QL SMEAR: ABNORMAL
AST SERPL-CCNC: 22 U/L (ref 12–45)
BASOPHILS # BLD AUTO: 0.7 % (ref 0–1.8)
BASOPHILS # BLD: 0.1 K/UL (ref 0–0.12)
BILIRUB SERPL-MCNC: 0.8 MG/DL (ref 0.1–1.5)
BUN SERPL-MCNC: 35 MG/DL (ref 8–22)
CALCIUM ALBUM COR SERPL-MCNC: 9.7 MG/DL (ref 8.5–10.5)
CALCIUM SERPL-MCNC: 9.6 MG/DL (ref 8.5–10.5)
CHLORIDE SERPL-SCNC: 94 MMOL/L (ref 96–112)
CHOLEST SERPL-MCNC: 111 MG/DL (ref 100–199)
CO2 SERPL-SCNC: 22 MMOL/L (ref 20–33)
COMMENT 1642: NORMAL
CREAT SERPL-MCNC: 1.79 MG/DL (ref 0.5–1.4)
CREAT UR-MCNC: 111.94 MG/DL
EOSINOPHIL # BLD AUTO: 0.28 K/UL (ref 0–0.51)
EOSINOPHIL NFR BLD: 2 % (ref 0–6.9)
ERYTHROCYTE [DISTWIDTH] IN BLOOD BY AUTOMATED COUNT: 58.7 FL (ref 35.9–50)
EST. AVERAGE GLUCOSE BLD GHB EST-MCNC: 140 MG/DL
FASTING STATUS PATIENT QL REPORTED: NORMAL
FERRITIN SERPL-MCNC: 100 NG/ML (ref 22–322)
GFR SERPLBLD CREATININE-BSD FMLA CKD-EPI: 41 ML/MIN/1.73 M 2
GLOBULIN SER CALC-MCNC: 4.4 G/DL (ref 1.9–3.5)
GLUCOSE SERPL-MCNC: 151 MG/DL (ref 65–99)
HBA1C MFR BLD: 6.5 % (ref 4–5.6)
HCT VFR BLD AUTO: 55.5 % (ref 42–52)
HDLC SERPL-MCNC: 33 MG/DL
HGB BLD-MCNC: 16.5 G/DL (ref 14–18)
IMM GRANULOCYTES # BLD AUTO: 0.09 K/UL (ref 0–0.11)
IMM GRANULOCYTES NFR BLD AUTO: 0.6 % (ref 0–0.9)
IRON SATN MFR SERPL: 7 % (ref 15–55)
IRON SERPL-MCNC: 25 UG/DL (ref 50–180)
LDLC SERPL CALC-MCNC: 46 MG/DL
LYMPHOCYTES # BLD AUTO: 2.01 K/UL (ref 1–4.8)
LYMPHOCYTES NFR BLD: 14.2 % (ref 22–41)
MCH RBC QN AUTO: 23.7 PG (ref 27–33)
MCHC RBC AUTO-ENTMCNC: 29.7 G/DL (ref 32.3–36.5)
MCV RBC AUTO: 79.6 FL (ref 81.4–97.8)
MICROALBUMIN UR-MCNC: 34.3 MG/DL
MICROALBUMIN/CREAT UR: 306 MG/G (ref 0–30)
MICROCYTES BLD QL SMEAR: ABNORMAL
MONOCYTES # BLD AUTO: 1.29 K/UL (ref 0–0.85)
MONOCYTES NFR BLD AUTO: 9.1 % (ref 0–13.4)
MORPHOLOGY BLD-IMP: NORMAL
NEUTROPHILS # BLD AUTO: 10.35 K/UL (ref 1.82–7.42)
NEUTROPHILS NFR BLD: 73.4 % (ref 44–72)
NRBC # BLD AUTO: 0 K/UL
NRBC BLD-RTO: 0 /100 WBC (ref 0–0.2)
PLATELET # BLD AUTO: 299 K/UL (ref 164–446)
PLATELET BLD QL SMEAR: NORMAL
PMV BLD AUTO: 10.4 FL (ref 9–12.9)
POLYCHROMASIA BLD QL SMEAR: NORMAL
POTASSIUM SERPL-SCNC: 5 MMOL/L (ref 3.6–5.5)
PROT SERPL-MCNC: 8.3 G/DL (ref 6–8.2)
PSA SERPL-MCNC: 2.47 NG/ML (ref 0–4)
RBC # BLD AUTO: 6.97 M/UL (ref 4.7–6.1)
RBC BLD AUTO: PRESENT
SODIUM SERPL-SCNC: 130 MMOL/L (ref 135–145)
T3FREE SERPL-MCNC: 5.06 PG/ML (ref 2–4.4)
T4 FREE SERPL-MCNC: 1.52 NG/DL (ref 0.93–1.7)
TIBC SERPL-MCNC: 336 UG/DL (ref 250–450)
TRIGL SERPL-MCNC: 158 MG/DL (ref 0–149)
TSH SERPL DL<=0.005 MIU/L-ACNC: 1.7 UIU/ML (ref 0.38–5.33)
UIBC SERPL-MCNC: 311 UG/DL (ref 110–370)
VIT B12 SERPL-MCNC: >4000 PG/ML (ref 211–911)
WBC # BLD AUTO: 14.1 K/UL (ref 4.8–10.8)

## 2024-07-02 PROCEDURE — 85025 COMPLETE CBC W/AUTO DIFF WBC: CPT

## 2024-07-02 PROCEDURE — 82607 VITAMIN B-12: CPT

## 2024-07-02 PROCEDURE — 84443 ASSAY THYROID STIM HORMONE: CPT

## 2024-07-02 PROCEDURE — 84153 ASSAY OF PSA TOTAL: CPT

## 2024-07-02 PROCEDURE — 82570 ASSAY OF URINE CREATININE: CPT

## 2024-07-02 PROCEDURE — 80061 LIPID PANEL: CPT

## 2024-07-02 PROCEDURE — 82043 UR ALBUMIN QUANTITATIVE: CPT

## 2024-07-02 PROCEDURE — 80053 COMPREHEN METABOLIC PANEL: CPT

## 2024-07-02 PROCEDURE — 84403 ASSAY OF TOTAL TESTOSTERONE: CPT

## 2024-07-02 PROCEDURE — 83540 ASSAY OF IRON: CPT

## 2024-07-02 PROCEDURE — 84439 ASSAY OF FREE THYROXINE: CPT

## 2024-07-02 PROCEDURE — 36415 COLL VENOUS BLD VENIPUNCTURE: CPT

## 2024-07-02 PROCEDURE — 84270 ASSAY OF SEX HORMONE GLOBUL: CPT

## 2024-07-02 PROCEDURE — 83550 IRON BINDING TEST: CPT

## 2024-07-02 PROCEDURE — 84481 FREE ASSAY (FT-3): CPT

## 2024-07-02 PROCEDURE — 82728 ASSAY OF FERRITIN: CPT

## 2024-07-02 PROCEDURE — 84402 ASSAY OF FREE TESTOSTERONE: CPT

## 2024-07-02 PROCEDURE — 82306 VITAMIN D 25 HYDROXY: CPT

## 2024-07-02 PROCEDURE — 83036 HEMOGLOBIN GLYCOSYLATED A1C: CPT

## 2024-07-04 LAB
SHBG SERPL-SCNC: 30 NMOL/L (ref 19–76)
TESTOST FREE MFR SERPL: 2.3 % (ref 1.6–2.9)
TESTOST FREE SERPL-MCNC: 211 PG/ML (ref 47–244)
TESTOST SERPL-MCNC: 903 NG/DL (ref 300–720)
TESTOSTERONE.FREE+WB SERPL-MCNC: 537 NG/DL (ref 131–682)

## 2024-07-10 ENCOUNTER — PHARMACY VISIT (OUTPATIENT)
Dept: PHARMACY | Facility: MEDICAL CENTER | Age: 69
End: 2024-07-10
Payer: COMMERCIAL

## 2024-07-10 PROCEDURE — RXMED WILLOW AMBULATORY MEDICATION CHARGE: Performed by: INTERNAL MEDICINE

## 2024-07-13 DIAGNOSIS — E29.1 HYPOGONADISM IN MALE: ICD-10-CM

## 2024-07-13 DIAGNOSIS — E03.9 ACQUIRED HYPOTHYROIDISM: ICD-10-CM

## 2024-07-13 DIAGNOSIS — E11.69 HYPERLIPIDEMIA ASSOCIATED WITH TYPE 2 DIABETES MELLITUS (HCC): ICD-10-CM

## 2024-07-13 DIAGNOSIS — E78.5 HYPERLIPIDEMIA ASSOCIATED WITH TYPE 2 DIABETES MELLITUS (HCC): ICD-10-CM

## 2024-08-14 PROCEDURE — RXMED WILLOW AMBULATORY MEDICATION CHARGE: Performed by: INTERNAL MEDICINE

## 2024-08-17 NOTE — PROGRESS NOTES
Verbal consent was acquired by the patient to use [employer] ambient listening note generation during this visit Yes     Subjective:     CC:   Chief Complaint   Patient presents with    Follow-Up         HPI:     History of Present Illness  Raheem Ospina is a 69-year-old male who presents for follow-up medical visit.  His iron levels were recently detected to be low.His wife has started him on a supplement derived from organ meat, which he has been taking for about 2 weeks. He is lab orders to retest his iron. He is now under the care of a pain management specialist at [place]. He was noted to have episodes of hand swelling and pain which then migrated to other joints.  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  Blood work has been ordered for the evaluation of this.  He is now being prescribed tramadol and continues to take his Lyrica.  He also visits a chiropractor twice a week.          Past Medical History:   Diagnosis Date    Cardiovascular disease     Coronary artery disease     Diabetes (HCC)     Diabetic neuropathy (HCC)     Hyperlipidemia     Hypertension     Thyroid disease        Social History     Tobacco Use    Smoking status: Every Day     Current packs/day: 0.75     Average packs/day: 0.8 packs/day for 47.0 years (35.3 ttl pk-yrs)     Types: Cigarettes    Smokeless tobacco: Never   Vaping Use    Vaping status: Never Used   Substance Use Topics    Alcohol use: Not Currently    Drug use: Yes     Types: Marijuana     Comment: pt uses THC 5mg tablets everning to sleep        Current Outpatient Medications Ordered in Epic   Medication Sig Dispense Refill    NON SPECIFIED Iron and Vitamin D      pregabalin (LYRICA) 150 MG Cap Take 1 Capsule by mouth 2 times a day for 180 days. 180 Capsule 1    testosterone cypionate (DEPO-TESTOSTERONE) 200 MG/ML injection Inject 200 mg( 1 ml) intramuscular every 14 days 10 mL 0    testosterone cypionate  (DEPO-TESTOSTERONE) 200 MG/ML injection Inject 200 mg(1 ml) intramuscular every 14 days 84 days 6 mL 1    testosterone cypionate (DEPO-TESTOSTERONE) 200 MG/ML injection INJECT 200 mg( 1 ml) intramuscular every 14 days 150 days 6 mL 0    fluticasone-salmeterol (ADVAIR) 100-50 MCG/ACT AEROSOL POWDER, BREATH ACTIVATED Inhale 1 Puff every 12 hours. 60 Each 0    methylPREDNISolone (MEDROL DOSEPAK) 4 MG Tablet Therapy Pack As directed on the packaging label. 21 Tablet 0    ipratropium-albuterol (COMBIVENT RESPIMAT)  MCG/ACT Aero Soln Inhale 1 Puff 4 times a day. 1 Each 0    promethazine-dextromethorphan (PROMETHAZINE-DM) 6.25-15 MG/5ML syrup Take 5 mL by mouth 4 times a day as needed for Cough. 120 mL 0    diclofenac DR (VOLTAREN) 75 MG Tablet Delayed Response Take 1 Tablet by mouth 2 times a day. 60 Tablet 2    albuterol 108 (90 Base) MCG/ACT Aero Soln inhalation aerosol Inhale 2 Puffs every 6 hours as needed for Shortness of Breath. 8.5 g 0    OZEMPIC, 2 MG/DOSE, 8 MG/3ML Solution Pen-injector       testosterone cypionate (DEPO-TESTOSTERONE) 200 MG/ML Solution injection Inject 150 mg (0.75 ml ) intramuscularly every 14 days for 28 days 2 mL 5    losartan (COZAAR) 50 MG Tab TAKE ONE TABLET BY MOUTH DAILY 90 tablet 1    metFORMIN ER (GLUCOPHAGE XR) 500 MG TABLET SR 24 HR TAKE TWO TABLETS BY MOUTH TWICE A  tablet 1    pioglitazone (ACTOS) 30 MG Tab TAKE ONE TABLET BY MOUTH DAILY 90 tablet 0    atenolol (TENORMIN) 50 MG Tab TAKE 1 TAB BY MOUTH EVERY DAY. 90 Tab 2    atorvastatin (LIPITOR) 40 MG Tab TAKE ONE TABLET BY MOUTH DAILY 90 Tab 2    levothyroxine (SYNTHROID) 75 MCG Tab Take 1 Tab by mouth Every morning on an empty stomach. 90 Tab 2    Cholecalciferol (VITAMIN D) 50 MCG (2000 UT) Cap Take  by mouth.      Omega-3 Fatty Acids (OMEGA-3 FISH OIL) 1200 MG Cap Take  by mouth.      Dapagliflozin Propanediol (FARXIGA) 10 MG Tab Take 1 tablet by mouth every day. 90 Tab 3    aspirin EC (ECOTRIN) 325 MG Tablet  "Delayed Response Take 325 mg by mouth every day.      sildenafil citrate (VIAGRA) 100 MG tablet Take 100 mg by mouth as needed.      Coenzyme Q10 (CO Q 10 PO) Take  by mouth.       No current Epic-ordered facility-administered medications on file.       Allergies:  Benicar [olmesartan]    Health Maintenance: Completed    Review of Systems:  No fevers or chills. No cough, chest pain, or shortness of breath.       Objective:       Exam:  /70   Pulse 66   Temp 36.2 °C (97.2 °F) (Temporal)   Ht 1.88 m (6' 2\")   Wt 94.3 kg (208 lb)   SpO2 95%   BMI 26.71 kg/m²  Body mass index is 26.71 kg/m².    Gen: Alert and oriented, No apparent distress.  Lungs: Normal effort, CTA bilaterally, no wheezes, rhonchi, or rales  CV: Regular rate and rhythm. No murmurs, rubs, or gallops.  Ext: No clubbing, cyanosis, edema.        Assessment & Plan:     69 y.o. male with the following -     Hyperlipidemia associated with type 2 diabetes mellitus (HCC)  With regard to the patient's type 2 diabetes, chronic medical condition, controlled.  The patient is managed by endocrinology, Dr. Mas.  Hemoglobin A1c on 7/2/2024 was 6.5.  -Continue current regimen of metformin 1000 mg twice daily, pioglitazone 30 mg daily, Farxiga 10 mg daily, and Ozempic 2 mg weekly     With regard to the patient's hyperlipidemia, chronic condition, controlled.  The patient currently takes atorvastatin 40 mg nightly.  He denies statin associated myalgias.  Lipid panel on 7/2/2024 showed a total cholesterol 111, LDL 46, HDL 33, triglycerides 158.  -Continue current regimen of atorvastatin 40 mg nightly     Diabetic peripheral neuropathy (HCC)  Chronic condition, controlled.  The patient reports longstanding bilateral foot numbness and tingling.  He currently takes pregabalin 150 mg twice daily.  Review of the patient's  shows that he was last prescribed pregabalin 150 mg, dispo #60, on 8/13/2024. Obtained and reviewed patient utilization report from " [facility] pharmacy database on 8/24/2024 11:47 AM  prior to writing prescription for controlled substance II, III or IV per [place] bill . Based on assessment of the report, the prescription is medically necessary.   -Controlled substance treatment agreement and signed and scanned into the patient's chart on 5/21/2024  -Urine drug screen obtained on 5/21/2024  -Continue current regimen of pregabalin 150 mg twice daily  - pregabalin (LYRICA) 150 MG Cap; Take 1 Capsule by mouth 2 times a day for 90 days.  Dispense: 180 Capsule; Refill: 0     Primary hypertension  Chronic condition, controlled.  The patient currently takes atenolol 50 mg daily and losartan 50 mg daily.  Blood pressure at today's visit is 118/70. He denies new headaches, chest pain, shortness of breath, or lower extremity edema.    - Continue current regimen of atenolol 50 mg daily and losartan 50 mg daily     Acquired hypothyroidism  Chronic condition, controlled.  The patient is managed by endocrinology, Dr. Mas.  Most recent labs on 7/2/2024 showed a normal TSH and free T4 of 1.70 and 1.52.  The patient is clinically euthyroid.  -Continue current regimen of levothyroxine 75 mcg daily     Iron deficiency  - FERRITIN; Future  - IRON/TOTAL IRON BIND; Future       Return in about 3 months (around 11/22/2024) for Controlled Substance.    Please note that this dictation was created using voice recognition software. I have made every reasonable attempt to correct obvious errors, but I expect that there are errors of grammar and possibly content that I did not discover before finalizing the note.

## 2024-08-21 ENCOUNTER — PHARMACY VISIT (OUTPATIENT)
Dept: PHARMACY | Facility: MEDICAL CENTER | Age: 69
End: 2024-08-21
Payer: COMMERCIAL

## 2024-08-22 ENCOUNTER — OFFICE VISIT (OUTPATIENT)
Dept: MEDICAL GROUP | Facility: PHYSICIAN GROUP | Age: 69
End: 2024-08-22
Payer: COMMERCIAL

## 2024-08-22 VITALS
DIASTOLIC BLOOD PRESSURE: 70 MMHG | SYSTOLIC BLOOD PRESSURE: 118 MMHG | OXYGEN SATURATION: 95 % | TEMPERATURE: 97.2 F | HEART RATE: 66 BPM | WEIGHT: 208 LBS | BODY MASS INDEX: 26.69 KG/M2 | HEIGHT: 74 IN

## 2024-08-22 DIAGNOSIS — E11.42 DIABETIC PERIPHERAL NEUROPATHY (HCC): ICD-10-CM

## 2024-08-22 DIAGNOSIS — E78.5 HYPERLIPIDEMIA ASSOCIATED WITH TYPE 2 DIABETES MELLITUS (HCC): ICD-10-CM

## 2024-08-22 DIAGNOSIS — E11.69 HYPERLIPIDEMIA ASSOCIATED WITH TYPE 2 DIABETES MELLITUS (HCC): ICD-10-CM

## 2024-08-22 DIAGNOSIS — E03.9 ACQUIRED HYPOTHYROIDISM: ICD-10-CM

## 2024-08-22 DIAGNOSIS — I10 PRIMARY HYPERTENSION: ICD-10-CM

## 2024-08-22 DIAGNOSIS — E61.1 IRON DEFICIENCY: ICD-10-CM

## 2024-08-22 PROCEDURE — 3078F DIAST BP <80 MM HG: CPT | Performed by: INTERNAL MEDICINE

## 2024-08-22 PROCEDURE — 99214 OFFICE O/P EST MOD 30 MIN: CPT | Performed by: INTERNAL MEDICINE

## 2024-08-22 PROCEDURE — 3074F SYST BP LT 130 MM HG: CPT | Performed by: INTERNAL MEDICINE

## 2024-08-22 RX ORDER — PREGABALIN 150 MG/1
150 CAPSULE ORAL 2 TIMES DAILY
Qty: 180 CAPSULE | Refills: 1 | Status: SHIPPED | OUTPATIENT
Start: 2024-08-22 | End: 2025-02-18

## 2024-08-22 ASSESSMENT — PATIENT HEALTH QUESTIONNAIRE - PHQ9: CLINICAL INTERPRETATION OF PHQ2 SCORE: 0

## 2024-08-22 ASSESSMENT — FIBROSIS 4 INDEX: FIB4 SCORE: 1.692307692307692308

## 2024-09-04 PROCEDURE — RXMED WILLOW AMBULATORY MEDICATION CHARGE: Performed by: INTERNAL MEDICINE

## 2024-09-18 ENCOUNTER — PHARMACY VISIT (OUTPATIENT)
Dept: PHARMACY | Facility: MEDICAL CENTER | Age: 69
End: 2024-09-18
Payer: COMMERCIAL

## 2024-09-25 ENCOUNTER — HOSPITAL ENCOUNTER (OUTPATIENT)
Dept: LAB | Facility: MEDICAL CENTER | Age: 69
End: 2024-09-25
Attending: STUDENT IN AN ORGANIZED HEALTH CARE EDUCATION/TRAINING PROGRAM
Payer: COMMERCIAL

## 2024-09-25 ENCOUNTER — HOSPITAL ENCOUNTER (OUTPATIENT)
Dept: LAB | Facility: MEDICAL CENTER | Age: 69
End: 2024-09-25
Attending: INTERNAL MEDICINE
Payer: COMMERCIAL

## 2024-09-25 DIAGNOSIS — E03.9 ACQUIRED HYPOTHYROIDISM: ICD-10-CM

## 2024-09-25 DIAGNOSIS — E11.69 HYPERLIPIDEMIA ASSOCIATED WITH TYPE 2 DIABETES MELLITUS (HCC): ICD-10-CM

## 2024-09-25 DIAGNOSIS — E78.5 HYPERLIPIDEMIA ASSOCIATED WITH TYPE 2 DIABETES MELLITUS (HCC): ICD-10-CM

## 2024-09-25 DIAGNOSIS — E61.1 IRON DEFICIENCY: ICD-10-CM

## 2024-09-25 LAB
ALBUMIN SERPL BCP-MCNC: 4 G/DL (ref 3.2–4.9)
ALBUMIN/GLOB SERPL: 1.1 G/DL
ALP SERPL-CCNC: 105 U/L (ref 30–99)
ALT SERPL-CCNC: 12 U/L (ref 2–50)
ANION GAP SERPL CALC-SCNC: 14 MMOL/L (ref 7–16)
ANISOCYTOSIS BLD QL SMEAR: ABNORMAL
AST SERPL-CCNC: 23 U/L (ref 12–45)
BASOPHILS # BLD AUTO: 1.5 % (ref 0–1.8)
BASOPHILS # BLD: 0.17 K/UL (ref 0–0.12)
BILIRUB SERPL-MCNC: 0.4 MG/DL (ref 0.1–1.5)
BUN SERPL-MCNC: 24 MG/DL (ref 8–22)
CALCIUM ALBUM COR SERPL-MCNC: 10 MG/DL (ref 8.5–10.5)
CALCIUM SERPL-MCNC: 10 MG/DL (ref 8.5–10.5)
CHLORIDE SERPL-SCNC: 100 MMOL/L (ref 96–112)
CHOLEST SERPL-MCNC: 130 MG/DL (ref 100–199)
CO2 SERPL-SCNC: 21 MMOL/L (ref 20–33)
COMMENT 1642: NORMAL
CREAT SERPL-MCNC: 1.31 MG/DL (ref 0.5–1.4)
CREAT UR-MCNC: 75.6 MG/DL
EOSINOPHIL # BLD AUTO: 0.32 K/UL (ref 0–0.51)
EOSINOPHIL NFR BLD: 2.8 % (ref 0–6.9)
ERYTHROCYTE [DISTWIDTH] IN BLOOD BY AUTOMATED COUNT: 66.6 FL (ref 35.9–50)
ERYTHROCYTE [SEDIMENTATION RATE] IN BLOOD BY WESTERGREN METHOD: 12 MM/HOUR (ref 0–20)
FERRITIN SERPL-MCNC: 20.6 NG/ML (ref 22–322)
GFR SERPLBLD CREATININE-BSD FMLA CKD-EPI: 59 ML/MIN/1.73 M 2
GLOBULIN SER CALC-MCNC: 3.8 G/DL (ref 1.9–3.5)
GLUCOSE SERPL-MCNC: 152 MG/DL (ref 65–99)
HCT VFR BLD AUTO: 60.1 % (ref 42–52)
HDLC SERPL-MCNC: 27 MG/DL
HGB BLD-MCNC: 18.3 G/DL (ref 14–18)
IMM GRANULOCYTES # BLD AUTO: 0.12 K/UL (ref 0–0.11)
IMM GRANULOCYTES NFR BLD AUTO: 1.1 % (ref 0–0.9)
IRON SATN MFR SERPL: 6 % (ref 15–55)
IRON SERPL-MCNC: 23 UG/DL (ref 50–180)
LDLC SERPL CALC-MCNC: 64 MG/DL
LYMPHOCYTES # BLD AUTO: 2.71 K/UL (ref 1–4.8)
LYMPHOCYTES NFR BLD: 23.9 % (ref 22–41)
MCH RBC QN AUTO: 25 PG (ref 27–33)
MCHC RBC AUTO-ENTMCNC: 30.4 G/DL (ref 32.3–36.5)
MCV RBC AUTO: 82.2 FL (ref 81.4–97.8)
MICROALBUMIN UR-MCNC: 27.1 MG/DL
MICROALBUMIN/CREAT UR: 358 MG/G (ref 0–30)
MICROCYTES BLD QL SMEAR: ABNORMAL
MONOCYTES # BLD AUTO: 1.08 K/UL (ref 0–0.85)
MONOCYTES NFR BLD AUTO: 9.5 % (ref 0–13.4)
MORPHOLOGY BLD-IMP: NORMAL
NEUTROPHILS # BLD AUTO: 6.92 K/UL (ref 1.82–7.42)
NEUTROPHILS NFR BLD: 61.2 % (ref 44–72)
NRBC # BLD AUTO: 0 K/UL
NRBC BLD-RTO: 0 /100 WBC (ref 0–0.2)
PLATELET # BLD AUTO: 258 K/UL (ref 164–446)
PLATELET BLD QL SMEAR: NORMAL
PMV BLD AUTO: 9.8 FL (ref 9–12.9)
POTASSIUM SERPL-SCNC: 5.4 MMOL/L (ref 3.6–5.5)
PROT SERPL-MCNC: 7.8 G/DL (ref 6–8.2)
RBC # BLD AUTO: 7.31 M/UL (ref 4.7–6.1)
RBC BLD AUTO: PRESENT
SODIUM SERPL-SCNC: 135 MMOL/L (ref 135–145)
T4 FREE SERPL-MCNC: 1.66 NG/DL (ref 0.93–1.7)
TIBC SERPL-MCNC: 391 UG/DL (ref 250–450)
TRIGL SERPL-MCNC: 194 MG/DL (ref 0–149)
TSH SERPL-ACNC: 1.34 UIU/ML (ref 0.35–5.5)
UIBC SERPL-MCNC: 368 UG/DL (ref 110–370)
URATE SERPL-MCNC: 6.8 MG/DL (ref 2.5–8.3)
WBC # BLD AUTO: 11.3 K/UL (ref 4.8–10.8)

## 2024-09-25 PROCEDURE — 84439 ASSAY OF FREE THYROXINE: CPT

## 2024-09-25 PROCEDURE — 83550 IRON BINDING TEST: CPT

## 2024-09-25 PROCEDURE — 86200 CCP ANTIBODY: CPT

## 2024-09-25 PROCEDURE — 36415 COLL VENOUS BLD VENIPUNCTURE: CPT

## 2024-09-25 PROCEDURE — 85025 COMPLETE CBC W/AUTO DIFF WBC: CPT

## 2024-09-25 PROCEDURE — RXMED WILLOW AMBULATORY MEDICATION CHARGE: Performed by: INTERNAL MEDICINE

## 2024-09-25 PROCEDURE — 86038 ANTINUCLEAR ANTIBODIES: CPT

## 2024-09-25 PROCEDURE — 86431 RHEUMATOID FACTOR QUANT: CPT

## 2024-09-25 PROCEDURE — 85652 RBC SED RATE AUTOMATED: CPT

## 2024-09-25 PROCEDURE — 84443 ASSAY THYROID STIM HORMONE: CPT

## 2024-09-25 PROCEDURE — 83516 IMMUNOASSAY NONANTIBODY: CPT

## 2024-09-25 PROCEDURE — 84550 ASSAY OF BLOOD/URIC ACID: CPT

## 2024-09-25 PROCEDURE — 83540 ASSAY OF IRON: CPT

## 2024-09-25 PROCEDURE — 82570 ASSAY OF URINE CREATININE: CPT

## 2024-09-25 PROCEDURE — 80061 LIPID PANEL: CPT

## 2024-09-25 PROCEDURE — 80053 COMPREHEN METABOLIC PANEL: CPT

## 2024-09-25 PROCEDURE — 82043 UR ALBUMIN QUANTITATIVE: CPT

## 2024-09-25 PROCEDURE — 82728 ASSAY OF FERRITIN: CPT

## 2024-09-27 LAB — NUCLEAR IGG SER QL IA: NORMAL

## 2024-09-29 LAB
CARBAMYLATED PROTEIN ANTIBODY, IGG Q6043: 12 UNITS (ref 0–19)
CCP IGA+IGG SERPL IA-ACNC: 172 UNITS (ref 0–19)
RHEUMATOID FACT SER NEPH-ACNC: 26 IU/ML (ref 0–14)

## 2024-10-02 ENCOUNTER — PHARMACY VISIT (OUTPATIENT)
Dept: PHARMACY | Facility: MEDICAL CENTER | Age: 69
End: 2024-10-02
Payer: COMMERCIAL

## 2024-10-16 ENCOUNTER — PHARMACY VISIT (OUTPATIENT)
Dept: PHARMACY | Facility: MEDICAL CENTER | Age: 69
End: 2024-10-16
Payer: COMMERCIAL

## 2024-10-16 PROCEDURE — RXMED WILLOW AMBULATORY MEDICATION CHARGE: Performed by: INTERNAL MEDICINE

## 2024-10-21 ENCOUNTER — OFFICE VISIT (OUTPATIENT)
Dept: MEDICAL GROUP | Facility: PHYSICIAN GROUP | Age: 69
End: 2024-10-21
Payer: COMMERCIAL

## 2024-10-21 VITALS
SYSTOLIC BLOOD PRESSURE: 126 MMHG | WEIGHT: 210.2 LBS | RESPIRATION RATE: 16 BRPM | OXYGEN SATURATION: 96 % | DIASTOLIC BLOOD PRESSURE: 66 MMHG | HEIGHT: 74 IN | BODY MASS INDEX: 26.98 KG/M2 | TEMPERATURE: 97.3 F | HEART RATE: 80 BPM

## 2024-10-21 DIAGNOSIS — I25.10 CORONARY ARTERY DISEASE INVOLVING NATIVE CORONARY ARTERY OF NATIVE HEART WITHOUT ANGINA PECTORIS: ICD-10-CM

## 2024-10-21 DIAGNOSIS — Z95.1 HISTORY OF THREE VESSEL CORONARY ARTERY BYPASS: ICD-10-CM

## 2024-10-21 DIAGNOSIS — J44.9 CHRONIC OBSTRUCTIVE PULMONARY DISEASE, UNSPECIFIED COPD TYPE (HCC): ICD-10-CM

## 2024-10-21 DIAGNOSIS — Z12.11 COLON CANCER SCREENING: ICD-10-CM

## 2024-10-21 DIAGNOSIS — F17.210 CIGARETTE SMOKER: ICD-10-CM

## 2024-10-21 DIAGNOSIS — D58.2 ELEVATED HEMOGLOBIN (HCC): ICD-10-CM

## 2024-10-21 DIAGNOSIS — I15.2 HYPERTENSION ASSOCIATED WITH TYPE 2 DIABETES MELLITUS (HCC): ICD-10-CM

## 2024-10-21 DIAGNOSIS — E03.9 ACQUIRED HYPOTHYROIDISM: ICD-10-CM

## 2024-10-21 DIAGNOSIS — E11.42 DIABETIC PERIPHERAL NEUROPATHY (HCC): ICD-10-CM

## 2024-10-21 DIAGNOSIS — E61.1 IRON DEFICIENCY: ICD-10-CM

## 2024-10-21 DIAGNOSIS — M05.80 POLYARTHRITIS WITH POSITIVE RHEUMATOID FACTOR (HCC): ICD-10-CM

## 2024-10-21 DIAGNOSIS — E11.8 CONTROLLED TYPE 2 DIABETES MELLITUS WITH COMPLICATION, WITHOUT LONG-TERM CURRENT USE OF INSULIN (HCC): ICD-10-CM

## 2024-10-21 DIAGNOSIS — E29.1 HYPOGONADISM IN MALE: ICD-10-CM

## 2024-10-21 DIAGNOSIS — Z71.89 ADVANCE CARE PLANNING: ICD-10-CM

## 2024-10-21 DIAGNOSIS — E78.5 HYPERLIPIDEMIA ASSOCIATED WITH TYPE 2 DIABETES MELLITUS (HCC): ICD-10-CM

## 2024-10-21 DIAGNOSIS — E11.59 HYPERTENSION ASSOCIATED WITH TYPE 2 DIABETES MELLITUS (HCC): ICD-10-CM

## 2024-10-21 DIAGNOSIS — E11.69 HYPERLIPIDEMIA ASSOCIATED WITH TYPE 2 DIABETES MELLITUS (HCC): ICD-10-CM

## 2024-10-21 PROCEDURE — 3078F DIAST BP <80 MM HG: CPT | Performed by: PHYSICIAN ASSISTANT

## 2024-10-21 PROCEDURE — 3074F SYST BP LT 130 MM HG: CPT | Performed by: PHYSICIAN ASSISTANT

## 2024-10-21 PROCEDURE — 99417 PROLNG OP E/M EACH 15 MIN: CPT | Performed by: PHYSICIAN ASSISTANT

## 2024-10-21 PROCEDURE — 99215 OFFICE O/P EST HI 40 MIN: CPT | Performed by: PHYSICIAN ASSISTANT

## 2024-10-21 RX ORDER — TRAMADOL HYDROCHLORIDE 50 MG/1
TABLET ORAL
COMMUNITY
Start: 2024-09-26

## 2024-10-21 RX ORDER — ERGOCALCIFEROL 1.25 MG/1
CAPSULE, LIQUID FILLED ORAL
COMMUNITY
Start: 2024-09-26

## 2024-10-21 RX ORDER — LOSARTAN POTASSIUM 100 MG/1
TABLET ORAL
COMMUNITY
Start: 2024-09-09

## 2024-10-21 RX ORDER — PREDNISONE 10 MG/1
TABLET ORAL
COMMUNITY
Start: 2024-09-05 | End: 2024-10-21

## 2024-10-21 ASSESSMENT — ENCOUNTER SYMPTOMS
CHILLS: 0
FEVER: 0
SHORTNESS OF BREATH: 0

## 2024-10-21 ASSESSMENT — FIBROSIS 4 INDEX: FIB4 SCORE: 1.78
